# Patient Record
Sex: MALE | Race: OTHER | HISPANIC OR LATINO | URBAN - METROPOLITAN AREA
[De-identification: names, ages, dates, MRNs, and addresses within clinical notes are randomized per-mention and may not be internally consistent; named-entity substitution may affect disease eponyms.]

---

## 2017-04-02 ENCOUNTER — INPATIENT (INPATIENT)
Facility: HOSPITAL | Age: 52
LOS: 8 days | Discharge: HOME HEALTH | DRG: 494 | End: 2017-04-11
Attending: SPECIALIST | Admitting: SPECIALIST
Payer: COMMERCIAL

## 2017-04-02 ENCOUNTER — EMERGENCY (EMERGENCY)
Facility: HOSPITAL | Age: 52
LOS: 1 days | Discharge: OTHER INSTITUTION | End: 2017-04-02
Attending: EMERGENCY MEDICINE | Admitting: EMERGENCY MEDICINE
Payer: MEDICAID

## 2017-04-02 VITALS
OXYGEN SATURATION: 95 % | DIASTOLIC BLOOD PRESSURE: 79 MMHG | TEMPERATURE: 99 F | HEART RATE: 102 BPM | RESPIRATION RATE: 16 BRPM | SYSTOLIC BLOOD PRESSURE: 126 MMHG

## 2017-04-02 VITALS
OXYGEN SATURATION: 97 % | DIASTOLIC BLOOD PRESSURE: 80 MMHG | SYSTOLIC BLOOD PRESSURE: 141 MMHG | HEART RATE: 102 BPM | TEMPERATURE: 99 F | RESPIRATION RATE: 16 BRPM

## 2017-04-02 VITALS
OXYGEN SATURATION: 96 % | RESPIRATION RATE: 20 BRPM | HEART RATE: 105 BPM | SYSTOLIC BLOOD PRESSURE: 146 MMHG | DIASTOLIC BLOOD PRESSURE: 71 MMHG

## 2017-04-02 DIAGNOSIS — M25.561 PAIN IN RIGHT KNEE: ICD-10-CM

## 2017-04-02 DIAGNOSIS — F17.200 NICOTINE DEPENDENCE, UNSPECIFIED, UNCOMPLICATED: ICD-10-CM

## 2017-04-02 DIAGNOSIS — S82.201A UNSPECIFIED FRACTURE OF SHAFT OF RIGHT TIBIA, INITIAL ENCOUNTER FOR CLOSED FRACTURE: ICD-10-CM

## 2017-04-02 LAB
ALBUMIN SERPL ELPH-MCNC: 4.5 G/DL — SIGNIFICANT CHANGE UP (ref 3.4–5)
ALP SERPL-CCNC: 76 U/L — SIGNIFICANT CHANGE UP (ref 40–120)
ALT FLD-CCNC: 45 U/L — HIGH (ref 12–42)
ANION GAP SERPL CALC-SCNC: 15 MMOL/L — SIGNIFICANT CHANGE UP (ref 9–16)
APTT BLD: 27.4 SEC — LOW (ref 27.5–36.5)
AST SERPL-CCNC: 29 U/L — SIGNIFICANT CHANGE UP (ref 15–37)
BASOPHILS NFR BLD AUTO: 0.5 % — SIGNIFICANT CHANGE UP (ref 0–2)
BILIRUB SERPL-MCNC: 1.1 MG/DL — SIGNIFICANT CHANGE UP (ref 0.2–1.2)
BUN SERPL-MCNC: 17 MG/DL — SIGNIFICANT CHANGE UP (ref 7–23)
CALCIUM SERPL-MCNC: 9.3 MG/DL — SIGNIFICANT CHANGE UP (ref 8.5–10.5)
CHLORIDE SERPL-SCNC: 102 MMOL/L — SIGNIFICANT CHANGE UP (ref 96–108)
CO2 SERPL-SCNC: 23 MMOL/L — SIGNIFICANT CHANGE UP (ref 22–31)
CREAT SERPL-MCNC: 1.04 MG/DL — SIGNIFICANT CHANGE UP (ref 0.5–1.3)
EOSINOPHIL NFR BLD AUTO: 0 % — SIGNIFICANT CHANGE UP (ref 0–6)
GLUCOSE SERPL-MCNC: 84 MG/DL — SIGNIFICANT CHANGE UP (ref 70–99)
HCT VFR BLD CALC: 42.4 % — SIGNIFICANT CHANGE UP (ref 39–50)
HGB BLD-MCNC: 14.5 G/DL — SIGNIFICANT CHANGE UP (ref 13–17)
IMM GRANULOCYTES NFR BLD AUTO: 0.7 % — SIGNIFICANT CHANGE UP (ref 0–1.5)
INR BLD: 1.05 — SIGNIFICANT CHANGE UP (ref 0.88–1.16)
LYMPHOCYTES # BLD AUTO: 13.9 % — SIGNIFICANT CHANGE UP (ref 13–44)
MCHC RBC-ENTMCNC: 31.9 PG — SIGNIFICANT CHANGE UP (ref 27–34)
MCHC RBC-ENTMCNC: 34.2 G/DL — SIGNIFICANT CHANGE UP (ref 32–36)
MCV RBC AUTO: 93.2 FL — SIGNIFICANT CHANGE UP (ref 80–100)
MONOCYTES NFR BLD AUTO: 8 % — SIGNIFICANT CHANGE UP (ref 2–14)
NEUTROPHILS NFR BLD AUTO: 76.9 % — SIGNIFICANT CHANGE UP (ref 43–77)
PLATELET # BLD AUTO: 190 K/UL — SIGNIFICANT CHANGE UP (ref 150–400)
POTASSIUM SERPL-MCNC: 4.3 MMOL/L — SIGNIFICANT CHANGE UP (ref 3.5–5.3)
POTASSIUM SERPL-SCNC: 4.3 MMOL/L — SIGNIFICANT CHANGE UP (ref 3.5–5.3)
PROT SERPL-MCNC: 8 G/DL — SIGNIFICANT CHANGE UP (ref 6.4–8.2)
PROTHROM AB SERPL-ACNC: 11.6 SEC — SIGNIFICANT CHANGE UP (ref 9.8–12.7)
RBC # BLD: 4.55 M/UL — SIGNIFICANT CHANGE UP (ref 4.2–5.8)
RBC # FLD: 13.1 % — SIGNIFICANT CHANGE UP (ref 10.3–16.9)
SODIUM SERPL-SCNC: 140 MMOL/L — SIGNIFICANT CHANGE UP (ref 132–145)
WBC # BLD: 12.2 K/UL — HIGH (ref 3.8–10.5)
WBC # FLD AUTO: 12.2 K/UL — HIGH (ref 3.8–10.5)

## 2017-04-02 PROCEDURE — 70450 CT HEAD/BRAIN W/O DYE: CPT | Mod: 26

## 2017-04-02 PROCEDURE — 73590 X-RAY EXAM OF LOWER LEG: CPT | Mod: 26,RT

## 2017-04-02 PROCEDURE — 73700 CT LOWER EXTREMITY W/O DYE: CPT | Mod: 26,RT

## 2017-04-02 PROCEDURE — 99285 EMERGENCY DEPT VISIT HI MDM: CPT | Mod: 25

## 2017-04-02 PROCEDURE — 73562 X-RAY EXAM OF KNEE 3: CPT | Mod: 26,RT

## 2017-04-02 PROCEDURE — 99285 EMERGENCY DEPT VISIT HI MDM: CPT

## 2017-04-02 PROCEDURE — 71010: CPT | Mod: 26

## 2017-04-02 RX ORDER — MORPHINE SULFATE 50 MG/1
4 CAPSULE, EXTENDED RELEASE ORAL EVERY 4 HOURS
Qty: 0 | Refills: 0 | Status: DISCONTINUED | OUTPATIENT
Start: 2017-04-02 | End: 2017-04-03

## 2017-04-02 RX ORDER — MORPHINE SULFATE 50 MG/1
2 CAPSULE, EXTENDED RELEASE ORAL EVERY 4 HOURS
Qty: 0 | Refills: 0 | Status: DISCONTINUED | OUTPATIENT
Start: 2017-04-02 | End: 2017-04-04

## 2017-04-02 RX ORDER — OXYCODONE HYDROCHLORIDE 5 MG/1
10 TABLET ORAL EVERY 4 HOURS
Qty: 0 | Refills: 0 | Status: DISCONTINUED | OUTPATIENT
Start: 2017-04-02 | End: 2017-04-03

## 2017-04-02 RX ORDER — SODIUM CHLORIDE 9 MG/ML
1000 INJECTION, SOLUTION INTRAVENOUS
Qty: 0 | Refills: 0 | Status: DISCONTINUED | OUTPATIENT
Start: 2017-04-02 | End: 2017-04-03

## 2017-04-02 RX ORDER — MORPHINE SULFATE 50 MG/1
2 CAPSULE, EXTENDED RELEASE ORAL ONCE
Qty: 0 | Refills: 0 | Status: DISCONTINUED | OUTPATIENT
Start: 2017-04-02 | End: 2017-04-02

## 2017-04-02 RX ORDER — SODIUM CHLORIDE 9 MG/ML
3 INJECTION INTRAMUSCULAR; INTRAVENOUS; SUBCUTANEOUS ONCE
Qty: 0 | Refills: 0 | Status: COMPLETED | OUTPATIENT
Start: 2017-04-02 | End: 2017-04-02

## 2017-04-02 RX ORDER — OXYCODONE HYDROCHLORIDE 5 MG/1
5 TABLET ORAL EVERY 4 HOURS
Qty: 0 | Refills: 0 | Status: DISCONTINUED | OUTPATIENT
Start: 2017-04-02 | End: 2017-04-03

## 2017-04-02 RX ORDER — KETOROLAC TROMETHAMINE 30 MG/ML
30 SYRINGE (ML) INJECTION ONCE
Qty: 0 | Refills: 0 | Status: DISCONTINUED | OUTPATIENT
Start: 2017-04-02 | End: 2017-04-02

## 2017-04-02 RX ORDER — HYDROMORPHONE HYDROCHLORIDE 2 MG/ML
1 INJECTION INTRAMUSCULAR; INTRAVENOUS; SUBCUTANEOUS ONCE
Qty: 0 | Refills: 0 | Status: DISCONTINUED | OUTPATIENT
Start: 2017-04-02 | End: 2017-04-02

## 2017-04-02 RX ORDER — INFLUENZA VIRUS VACCINE 15; 15; 15; 15 UG/.5ML; UG/.5ML; UG/.5ML; UG/.5ML
0.5 SUSPENSION INTRAMUSCULAR ONCE
Qty: 0 | Refills: 0 | Status: COMPLETED | OUTPATIENT
Start: 2017-04-02 | End: 2017-04-02

## 2017-04-02 RX ORDER — CAPTOPRIL 12.5 MG/1
50 TABLET ORAL ONCE
Qty: 0 | Refills: 0 | Status: DISCONTINUED | OUTPATIENT
Start: 2017-04-02 | End: 2017-04-02

## 2017-04-02 RX ADMIN — MORPHINE SULFATE 2 MILLIGRAM(S): 50 CAPSULE, EXTENDED RELEASE ORAL at 12:10

## 2017-04-02 RX ADMIN — SODIUM CHLORIDE 120 MILLILITER(S): 9 INJECTION, SOLUTION INTRAVENOUS at 19:24

## 2017-04-02 RX ADMIN — Medication 30 MILLIGRAM(S): at 11:16

## 2017-04-02 RX ADMIN — MORPHINE SULFATE 2 MILLIGRAM(S): 50 CAPSULE, EXTENDED RELEASE ORAL at 11:16

## 2017-04-02 RX ADMIN — MORPHINE SULFATE 4 MILLIGRAM(S): 50 CAPSULE, EXTENDED RELEASE ORAL at 23:30

## 2017-04-02 RX ADMIN — MORPHINE SULFATE 4 MILLIGRAM(S): 50 CAPSULE, EXTENDED RELEASE ORAL at 19:24

## 2017-04-02 RX ADMIN — OXYCODONE HYDROCHLORIDE 10 MILLIGRAM(S): 5 TABLET ORAL at 21:12

## 2017-04-02 RX ADMIN — OXYCODONE HYDROCHLORIDE 10 MILLIGRAM(S): 5 TABLET ORAL at 19:59

## 2017-04-02 RX ADMIN — MORPHINE SULFATE 4 MILLIGRAM(S): 50 CAPSULE, EXTENDED RELEASE ORAL at 19:39

## 2017-04-02 RX ADMIN — Medication 30 MILLIGRAM(S): at 12:10

## 2017-04-02 RX ADMIN — SODIUM CHLORIDE 3 MILLILITER(S): 9 INJECTION INTRAMUSCULAR; INTRAVENOUS; SUBCUTANEOUS at 11:16

## 2017-04-02 RX ADMIN — MORPHINE SULFATE 4 MILLIGRAM(S): 50 CAPSULE, EXTENDED RELEASE ORAL at 23:45

## 2017-04-02 RX ADMIN — HYDROMORPHONE HYDROCHLORIDE 1 MILLIGRAM(S): 2 INJECTION INTRAMUSCULAR; INTRAVENOUS; SUBCUTANEOUS at 17:03
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
## 2017-04-02 NOTE — ED ADULT NURSE REASSESSMENT NOTE - NS ED NURSE REASSESS COMMENT FT1
Pt is laying in bed comfortably, in NAD. Pt was seen by ortho, pending transfer to [facility] ED. Will continue to monitor.

## 2017-04-02 NOTE — ED PROVIDER NOTE - MEDICAL DECISION MAKING DETAILS
uncomplicated ed course. pt to be transferred to [facility] ED - accepted by ortho dr Preciado to his service.  endorsed to ER [facility] Dr. Brown. uncomplicated ed course. pt to be transferred to [facility] ED - accepted by ortho dr Preciado to his service.  endorsed to ER [facility] Dr. Brown.    RLE 2+ dp pulse warm skin uncomplicated ed course. pt to be transferred to [facility] ED - accepted by ortho dr Preciado to his service.  endorsed to ER [facility] Dr. Saez.    RLE 2+ dp pulse warm skin

## 2017-04-02 NOTE — ED PROVIDER NOTE - NEUROLOGICAL, MLM
Alert and oriented, no focal deficits. speech fluent and appropriate Alert and oriented, no focal deficits. speech fluent and appropriate speech fluent and appropriate cooperative
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
## 2017-04-02 NOTE — PROGRESS NOTE ADULT - SUBJECTIVE AND OBJECTIVE BOX
Ortho Preop Note    Patient is a 51y old  Male who presents with a chief complaint of right tibial plateau fracture (02 Apr 2017 18:41)    Diagnosis: right tibial plateau fracture  Procedure: ORIF vs ex fix  Surgeon: Nick Almeida   12.2  )-----------( 190      ( 02 Apr 2017 11:21 )             42.4     02 Apr 2017 11:21    140    |  102    |  17     ----------------------------<  84     4.3     |  23     |  1.04     Ca    9.3        02 Apr 2017 11:21    TPro  8.0    /  Alb  4.5    /  TBili  1.1    /  DBili  x      /  AST  29     /  ALT  45     /  AlkPhos  76     02 Apr 2017 11:21    PT/INR - ( 02 Apr 2017 11:21 )   PT: 11.6 sec;   INR: 1.05          PTT - ( 02 Apr 2017 11:21 )  PTT:27.4 sec      [ ] Type & Screen  [x] CBC  [x] BMP  [x] PT/PTT/INR  [ ] Urinalysis  [x] Chest X-ray  [x] EKG  [x] NPO/IVF  [x] Consent  [ ] Clearance  [x] Added on to OR Schedule  [x] Anti-coagulation held  [N/A] MRSA/MSSA Nasal Screen

## 2017-04-02 NOTE — H&P ADULT - ASSESSMENT
-admit to Orthopaedic service  -operative treatment ORIF vs external fixation  -preoperative planning: NPO, IVF  -pain control  -obtain medical clearance  -hold DVT prophylaxis  -restart home medications  -monitor vital signs  -nonweightbearing status until post procedure  -physical therapy evaluation  -discussed with senior resident and attending on call
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
## 2017-04-02 NOTE — PRE-OP CHECKLIST - SELECT TESTS ORDERED
CMP/CBC Type and Screen/CMP/Urinalysis/BMP/EKG/PT/PTT/CBC/INR INR/EKG/BMP/CMP/Type and Screen/CXR/CBC/PT/PTT/Urinalysis
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
## 2017-04-02 NOTE — ED PROVIDER NOTE - CONDUCTED A DETAILED DISCUSSION WITH PATIENT AND/OR GUARDIAN REGARDING, MDM
need for transfer/lab results/need to admit/radiology results/return to ED if symptoms worsen, persist or questions arise
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
## 2017-04-02 NOTE — H&P ADULT - NSHPPHYSICALEXAM_GEN_ALL_CORE
O: AOx3, in bed, reclined, comfortable  Motor: intact GS/TA/EHL/FHL BL  Right knee swollen, ttp, no echymosses or skin break  SILT to patients baseline BL  WWP DP PT BL  Removed long leg posterior splint and applied knee immobilizer

## 2017-04-02 NOTE — ED PROVIDER NOTE - MUSCULOSKELETAL, MLM
Spine appears normal, range of motion is not limited, no muscle or joint tenderness + tenderness swelling to right knee w decreased rom secondary to pain

## 2017-04-03 DIAGNOSIS — Z01.818 ENCOUNTER FOR OTHER PREPROCEDURAL EXAMINATION: ICD-10-CM

## 2017-04-03 DIAGNOSIS — K21.9 GASTRO-ESOPHAGEAL REFLUX DISEASE WITHOUT ESOPHAGITIS: ICD-10-CM

## 2017-04-03 DIAGNOSIS — S82.141A DISPLACED BICONDYLAR FRACTURE OF RIGHT TIBIA, INITIAL ENCOUNTER FOR CLOSED FRACTURE: ICD-10-CM

## 2017-04-03 LAB
APPEARANCE UR: CLEAR — SIGNIFICANT CHANGE UP
BILIRUB UR-MCNC: (no result)
COLOR SPEC: YELLOW — SIGNIFICANT CHANGE UP
DIFF PNL FLD: (no result)
GLUCOSE UR QL: NEGATIVE — SIGNIFICANT CHANGE UP
KETONES UR-MCNC: >=80 MG/DL
LEUKOCYTE ESTERASE UR-ACNC: NEGATIVE — SIGNIFICANT CHANGE UP
NITRITE UR-MCNC: NEGATIVE — SIGNIFICANT CHANGE UP
PH UR: 5.5 — SIGNIFICANT CHANGE UP (ref 4–8)
PROT UR-MCNC: (no result) MG/DL
SP GR SPEC: 1.02 — SIGNIFICANT CHANGE UP (ref 1–1.03)
UROBILINOGEN FLD QL: 0.2 E.U./DL — SIGNIFICANT CHANGE UP

## 2017-04-03 PROCEDURE — 99222 1ST HOSP IP/OBS MODERATE 55: CPT | Mod: GC

## 2017-04-03 PROCEDURE — 73560 X-RAY EXAM OF KNEE 1 OR 2: CPT | Mod: 26,RT

## 2017-04-03 RX ORDER — OXYCODONE HYDROCHLORIDE 5 MG/1
10 TABLET ORAL EVERY 4 HOURS
Qty: 0 | Refills: 0 | Status: DISCONTINUED | OUTPATIENT
Start: 2017-04-03 | End: 2017-04-04

## 2017-04-03 RX ORDER — DOCUSATE SODIUM 100 MG
100 CAPSULE ORAL THREE TIMES A DAY
Qty: 0 | Refills: 0 | Status: DISCONTINUED | OUTPATIENT
Start: 2017-04-03 | End: 2017-04-11

## 2017-04-03 RX ORDER — MORPHINE SULFATE 50 MG/1
4 CAPSULE, EXTENDED RELEASE ORAL EVERY 4 HOURS
Qty: 0 | Refills: 0 | Status: DISCONTINUED | OUTPATIENT
Start: 2017-04-03 | End: 2017-04-04

## 2017-04-03 RX ORDER — SIMETHICONE 80 MG/1
80 TABLET, CHEWABLE ORAL
Qty: 0 | Refills: 0 | Status: DISCONTINUED | OUTPATIENT
Start: 2017-04-03 | End: 2017-04-11

## 2017-04-03 RX ORDER — SODIUM CHLORIDE 9 MG/ML
1000 INJECTION, SOLUTION INTRAVENOUS
Qty: 0 | Refills: 0 | Status: DISCONTINUED | OUTPATIENT
Start: 2017-04-03 | End: 2017-04-04

## 2017-04-03 RX ORDER — SODIUM CHLORIDE 9 MG/ML
1000 INJECTION, SOLUTION INTRAVENOUS
Qty: 0 | Refills: 0 | Status: DISCONTINUED | OUTPATIENT
Start: 2017-04-03 | End: 2017-04-03

## 2017-04-03 RX ORDER — OXYCODONE HYDROCHLORIDE 5 MG/1
5 TABLET ORAL EVERY 4 HOURS
Qty: 0 | Refills: 0 | Status: DISCONTINUED | OUTPATIENT
Start: 2017-04-03 | End: 2017-04-04

## 2017-04-03 RX ORDER — HEPARIN SODIUM 5000 [USP'U]/ML
5000 INJECTION INTRAVENOUS; SUBCUTANEOUS EVERY 8 HOURS
Qty: 0 | Refills: 0 | Status: DISCONTINUED | OUTPATIENT
Start: 2017-04-03 | End: 2017-04-11

## 2017-04-03 RX ORDER — ONDANSETRON 8 MG/1
4 TABLET, FILM COATED ORAL EVERY 4 HOURS
Qty: 0 | Refills: 0 | Status: DISCONTINUED | OUTPATIENT
Start: 2017-04-03 | End: 2017-04-11

## 2017-04-03 RX ORDER — MORPHINE SULFATE 50 MG/1
4 CAPSULE, EXTENDED RELEASE ORAL
Qty: 0 | Refills: 0 | Status: DISCONTINUED | OUTPATIENT
Start: 2017-04-03 | End: 2017-04-04

## 2017-04-03 RX ORDER — CEFAZOLIN SODIUM 1 G
2000 VIAL (EA) INJECTION EVERY 8 HOURS
Qty: 0 | Refills: 0 | Status: COMPLETED | OUTPATIENT
Start: 2017-04-03 | End: 2017-04-04

## 2017-04-03 RX ADMIN — MORPHINE SULFATE 2 MILLIGRAM(S): 50 CAPSULE, EXTENDED RELEASE ORAL at 23:55

## 2017-04-03 RX ADMIN — SIMETHICONE 80 MILLIGRAM(S): 80 TABLET, CHEWABLE ORAL at 21:15

## 2017-04-03 RX ADMIN — OXYCODONE HYDROCHLORIDE 10 MILLIGRAM(S): 5 TABLET ORAL at 02:00

## 2017-04-03 RX ADMIN — Medication 100 MILLIGRAM(S): at 23:00

## 2017-04-03 RX ADMIN — OXYCODONE HYDROCHLORIDE 10 MILLIGRAM(S): 5 TABLET ORAL at 21:03

## 2017-04-03 RX ADMIN — HEPARIN SODIUM 5000 UNIT(S): 5000 INJECTION INTRAVENOUS; SUBCUTANEOUS at 21:03

## 2017-04-03 RX ADMIN — MORPHINE SULFATE 2 MILLIGRAM(S): 50 CAPSULE, EXTENDED RELEASE ORAL at 23:41

## 2017-04-03 RX ADMIN — OXYCODONE HYDROCHLORIDE 10 MILLIGRAM(S): 5 TABLET ORAL at 10:51

## 2017-04-03 RX ADMIN — OXYCODONE HYDROCHLORIDE 10 MILLIGRAM(S): 5 TABLET ORAL at 02:41

## 2017-04-03 RX ADMIN — SODIUM CHLORIDE 140 MILLILITER(S): 9 INJECTION, SOLUTION INTRAVENOUS at 17:58

## 2017-04-03 RX ADMIN — OXYCODONE HYDROCHLORIDE 10 MILLIGRAM(S): 5 TABLET ORAL at 11:51

## 2017-04-03 RX ADMIN — Medication 100 MILLIGRAM(S): at 21:03

## 2017-04-03 RX ADMIN — OXYCODONE HYDROCHLORIDE 10 MILLIGRAM(S): 5 TABLET ORAL at 22:00

## 2017-04-03 RX ADMIN — SODIUM CHLORIDE 80 MILLILITER(S): 9 INJECTION, SOLUTION INTRAVENOUS at 10:59

## 2017-04-03 NOTE — PROVIDER CONTACT NOTE (OTHER) - SITUATION
Pt. unable to void since report from ER that he voided prior to coming to floor. Pt. states he has bladder discomfort 8/10 with distention. MD Milner rounded on Pt prior to this and mentioned a salcido.

## 2017-04-03 NOTE — PROVIDER CONTACT NOTE (OTHER) - RECOMMENDATIONS
MD Milner notified of findings. Pt is refusing catheter at this time. Insists to wait longer to try to void on his own.

## 2017-04-03 NOTE — PROGRESS NOTE ADULT - SUBJECTIVE AND OBJECTIVE BOX
ORTHO NOTE    [x ] Pt seen/examined.  [ x] Pt without any complaints/in NAD.    [ ] Pt complains of:      ROS: [ ] Fever  [ ] Chills  [ ] CP [ ] SOB [ ] Dysnea  [ ] Palpitations [ ] Cough [ ] N/V/C/D [ ] Paresthia [ ] Other     [ x] ROS  otherwise negative    .    PHYSICAL EXAM:    Vital Signs Last 24 Hrs  T(C): 36.4, Max: 37.4 (04-02 @ 17:41)  T(F): 97.5, Max: 99.3 (04-02 @ 17:41)  HR: 90 (90 - 103)  BP: 122/69 (107/78 - 126/79)  BP(mean): --  RR: 17 (16 - 18)  SpO2: 98% (95% - 98%)    I&O's Detail  I & Os for 24h ending 03 Apr 2017 07:00  =============================================  IN:    lactated ringers.: 840 ml    Total IN: 840 ml  ---------------------------------------------  OUT:    Total OUT: 0 ml  ---------------------------------------------  Total NET: 840 ml    I & Os for current day (as of 03 Apr 2017 13:16)  =============================================  IN:    Total IN: 0 ml  ---------------------------------------------  OUT:    Voided: 550 ml    Total OUT: 550 ml  ---------------------------------------------  Total NET: -550 ml       CAPILLARY BLOOD GLUCOSE                  Neuro: AAOX3    Lungs: CTA, IS demonstrated    CV:     ABD: soft, nontender     Ext: right LE NVID; right LE locked in knee immobilizer     LABS                        14.5   12.2  )-----------( 190      ( 02 Apr 2017 11:21 )             42.4                              PT/INR - ( 02 Apr 2017 11:21 )   PT: 11.6 sec;   INR: 1.05          PTT - ( 02 Apr 2017 11:21 )  PTT:27.4 sec  02 Apr 2017 11:21    140    |  102    |  17     ----------------------------<  84     4.3     |  23     |  1.04     Ca    9.3        02 Apr 2017 11:21    TPro  8.0    /  Alb  4.5    /  TBili  1.1    /  DBili  x      /  AST  29     /  ALT  45     /  AlkPhos  76     02 Apr 2017 11:21      [ ] Other Labs  [ ] None ordered            Please check or [place] when present:  •  Heart Failure:    [ ] Acute        [ ]  Acute on Chronic        [ ] Chronic         [ ] Diastolic     [ ]  Combined    •  RHEA:     [ ] ATN        [ ]  Renal medullary necrosis       [ ]  Renal cortical necrosis                  [ ] Other pathological Lesion:  •  CKD:  [ ] Stage I   [ ] Stage II  [ ] Stage III    [ ]Stage IV   [ ]  CKD V   [ ]  Other/Unspecified:    •  Abdominal Nutritional Status:   [ ] Malnutrition-See Nutrition note    [ ] Cachexia   [ ]  Other        [ ] Supplement ordered:            [ ] Morbid Obesity: BMI >=40         ASSESSMENT/PLAN:  Pre-op right tibial plateau ORIF  Medically cleared by Dr. Cervantes  LR @ 80  CONTINUE:          [ ] DVT PPX- scd boot L LE    [ ] Pain Mgt- IV meds

## 2017-04-03 NOTE — CONSULT NOTE ADULT - PROBLEM SELECTOR RECOMMENDATION 3
The patient's medical condition is optimized for surgery.  There is no contraindication for surgery.  There is no clinical evidence neither of angina, decompensated CHF, arrhthymias, nor valvular disease.   There is no limitation of exercise capacity.  MET is  VI.  ASA class is I .  Wagner cardiac risk factor is low .  DVT prophylaxis is indicated.  Pain control.  Early mobilization.  Avoid fluid overload.

## 2017-04-03 NOTE — PROGRESS NOTE ADULT - SUBJECTIVE AND OBJECTIVE BOX
Orthopaedics Post Op Check    Procedure: R Tibial Plateau ORIF  Surgeon: Dr. Romero    Pt comfortable, without complaints  Denies CP, SOB, N/V    Vital Signs Last 24 Hrs  T(C): 37.4, Max: 37.6 (04-03 @ 13:58)  T(F): 99.3, Max: 99.7 (04-03 @ 13:58)  HR: 96 (88 - 108)  BP: 162/84 (122/69 - 162/84)  BP(mean): --  RR: 17 (15 - 17)  SpO2: 96% (95% - 99%)  AVSS, NAD    Dressing C/D/I  General: Pt Alert and oriented   Pulses: PALPABLE DP and PT pulses w/ triphasic waves on doppler check  Sensation: SILT  Motor: Intact EHL/FHL  There is swelling at extremity  PROM does not cause pain at compartments  Lower (Right leg) compartments soft and depressible.                           14.5   12.2  )-----------( 190      ( 02 Apr 2017 11:21 )             42.4   02 Apr 2017 11:21    140    |  102    |  17     ----------------------------<  84     4.3     |  23     |  1.04     Ca    9.3        02 Apr 2017 11:21    TPro  8.0    /  Alb  4.5    /  TBili  1.1    /  DBili  x      /  AST  29     /  ALT  45     /  AlkPhos  76     02 Apr 2017 11:21    A/P: 51yMale POD#0 s/p R tibia plateau ORIF  - Stable  - Pain Control  - DVT ppx  - Post op abx  - PT, WBS: NWB in knee immobilizer  - F/U AM Labs

## 2017-04-03 NOTE — PROVIDER CONTACT NOTE (OTHER) - BACKGROUND
Pt. assisted to bed side and then again to BR to provide more comfort to void. Pt still unable to void. Assts. back to bed.
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
## 2017-04-03 NOTE — PROVIDER CONTACT NOTE (OTHER) - ACTION/TREATMENT ORDERED:
MD Milner stated to let him try longer on his own and to re assess. Day team to address this morning if pt. is still unable to void. MD Milner stated to let him try longer on his own and to re assess. UPdate: re assessed at 745 - pt. still refuses. Day team to address this morning if pt. is still unable to void.

## 2017-04-04 DIAGNOSIS — R00.0 TACHYCARDIA, UNSPECIFIED: ICD-10-CM

## 2017-04-04 LAB
ANION GAP SERPL CALC-SCNC: 11 MMOL/L — SIGNIFICANT CHANGE UP (ref 9–16)
BASOPHILS NFR BLD AUTO: 0.2 % — SIGNIFICANT CHANGE UP (ref 0–2)
BUN SERPL-MCNC: 12 MG/DL — SIGNIFICANT CHANGE UP (ref 7–23)
CALCIUM SERPL-MCNC: 8.2 MG/DL — LOW (ref 8.5–10.5)
CHLORIDE SERPL-SCNC: 101 MMOL/L — SIGNIFICANT CHANGE UP (ref 96–108)
CO2 SERPL-SCNC: 24 MMOL/L — SIGNIFICANT CHANGE UP (ref 22–31)
CREAT SERPL-MCNC: 0.93 MG/DL — SIGNIFICANT CHANGE UP (ref 0.5–1.3)
EOSINOPHIL NFR BLD AUTO: 0.1 % — SIGNIFICANT CHANGE UP (ref 0–6)
GLUCOSE SERPL-MCNC: 105 MG/DL — HIGH (ref 70–99)
HCT VFR BLD CALC: 34.7 % — LOW (ref 39–50)
HGB BLD-MCNC: 11.6 G/DL — LOW (ref 13–17)
LYMPHOCYTES # BLD AUTO: 12.6 % — LOW (ref 13–44)
MCHC RBC-ENTMCNC: 31.4 PG — SIGNIFICANT CHANGE UP (ref 27–34)
MCHC RBC-ENTMCNC: 33.4 G/DL — SIGNIFICANT CHANGE UP (ref 32–36)
MCV RBC AUTO: 94 FL — SIGNIFICANT CHANGE UP (ref 80–100)
MONOCYTES NFR BLD AUTO: 15.7 % — HIGH (ref 2–14)
NEUTROPHILS NFR BLD AUTO: 71.4 % — SIGNIFICANT CHANGE UP (ref 43–77)
PLATELET # BLD AUTO: 137 K/UL — LOW (ref 150–400)
POTASSIUM SERPL-MCNC: 3.9 MMOL/L — SIGNIFICANT CHANGE UP (ref 3.5–5.3)
POTASSIUM SERPL-SCNC: 3.9 MMOL/L — SIGNIFICANT CHANGE UP (ref 3.5–5.3)
RBC # BLD: 3.69 M/UL — LOW (ref 4.2–5.8)
RBC # FLD: 12.9 % — SIGNIFICANT CHANGE UP (ref 10.3–16.9)
SODIUM SERPL-SCNC: 136 MMOL/L — SIGNIFICANT CHANGE UP (ref 135–145)
WBC # BLD: 8 K/UL — SIGNIFICANT CHANGE UP (ref 3.8–10.5)
WBC # FLD AUTO: 8 K/UL — SIGNIFICANT CHANGE UP (ref 3.8–10.5)

## 2017-04-04 PROCEDURE — 93010 ELECTROCARDIOGRAM REPORT: CPT

## 2017-04-04 PROCEDURE — 99232 SBSQ HOSP IP/OBS MODERATE 35: CPT | Mod: GC

## 2017-04-04 RX ORDER — ACETAMINOPHEN 500 MG
650 TABLET ORAL EVERY 6 HOURS
Qty: 0 | Refills: 0 | Status: DISCONTINUED | OUTPATIENT
Start: 2017-04-04 | End: 2017-04-11

## 2017-04-04 RX ORDER — HYDROMORPHONE HYDROCHLORIDE 2 MG/ML
4 INJECTION INTRAMUSCULAR; INTRAVENOUS; SUBCUTANEOUS EVERY 4 HOURS
Qty: 0 | Refills: 0 | Status: DISCONTINUED | OUTPATIENT
Start: 2017-04-04 | End: 2017-04-11

## 2017-04-04 RX ORDER — SODIUM CHLORIDE 9 MG/ML
1000 INJECTION INTRAMUSCULAR; INTRAVENOUS; SUBCUTANEOUS
Qty: 0 | Refills: 0 | Status: DISCONTINUED | OUTPATIENT
Start: 2017-04-04 | End: 2017-04-11

## 2017-04-04 RX ORDER — HYDROMORPHONE HYDROCHLORIDE 2 MG/ML
2 INJECTION INTRAMUSCULAR; INTRAVENOUS; SUBCUTANEOUS EVERY 4 HOURS
Qty: 0 | Refills: 0 | Status: DISCONTINUED | OUTPATIENT
Start: 2017-04-04 | End: 2017-04-11

## 2017-04-04 RX ORDER — MORPHINE SULFATE 50 MG/1
4 CAPSULE, EXTENDED RELEASE ORAL
Qty: 0 | Refills: 0 | Status: DISCONTINUED | OUTPATIENT
Start: 2017-04-04 | End: 2017-04-04

## 2017-04-04 RX ORDER — HYDROMORPHONE HYDROCHLORIDE 2 MG/ML
0.5 INJECTION INTRAMUSCULAR; INTRAVENOUS; SUBCUTANEOUS ONCE
Qty: 0 | Refills: 0 | Status: DISCONTINUED | OUTPATIENT
Start: 2017-04-04 | End: 2017-04-04

## 2017-04-04 RX ORDER — SODIUM CHLORIDE 9 MG/ML
500 INJECTION INTRAMUSCULAR; INTRAVENOUS; SUBCUTANEOUS ONCE
Qty: 0 | Refills: 0 | Status: COMPLETED | OUTPATIENT
Start: 2017-04-04 | End: 2017-04-04

## 2017-04-04 RX ADMIN — Medication 100 MILLIGRAM(S): at 14:21

## 2017-04-04 RX ADMIN — OXYCODONE HYDROCHLORIDE 10 MILLIGRAM(S): 5 TABLET ORAL at 02:11

## 2017-04-04 RX ADMIN — HYDROMORPHONE HYDROCHLORIDE 0.5 MILLIGRAM(S): 2 INJECTION INTRAMUSCULAR; INTRAVENOUS; SUBCUTANEOUS at 10:25

## 2017-04-04 RX ADMIN — Medication 100 MILLIGRAM(S): at 06:07

## 2017-04-04 RX ADMIN — MORPHINE SULFATE 2 MILLIGRAM(S): 50 CAPSULE, EXTENDED RELEASE ORAL at 04:31

## 2017-04-04 RX ADMIN — HYDROMORPHONE HYDROCHLORIDE 4 MILLIGRAM(S): 2 INJECTION INTRAMUSCULAR; INTRAVENOUS; SUBCUTANEOUS at 21:45

## 2017-04-04 RX ADMIN — MORPHINE SULFATE 4 MILLIGRAM(S): 50 CAPSULE, EXTENDED RELEASE ORAL at 08:04

## 2017-04-04 RX ADMIN — HYDROMORPHONE HYDROCHLORIDE 4 MILLIGRAM(S): 2 INJECTION INTRAMUSCULAR; INTRAVENOUS; SUBCUTANEOUS at 22:35

## 2017-04-04 RX ADMIN — HEPARIN SODIUM 5000 UNIT(S): 5000 INJECTION INTRAVENOUS; SUBCUTANEOUS at 06:06

## 2017-04-04 RX ADMIN — OXYCODONE HYDROCHLORIDE 10 MILLIGRAM(S): 5 TABLET ORAL at 13:20

## 2017-04-04 RX ADMIN — OXYCODONE HYDROCHLORIDE 10 MILLIGRAM(S): 5 TABLET ORAL at 07:00

## 2017-04-04 RX ADMIN — OXYCODONE HYDROCHLORIDE 10 MILLIGRAM(S): 5 TABLET ORAL at 06:11

## 2017-04-04 RX ADMIN — HYDROMORPHONE HYDROCHLORIDE 0.5 MILLIGRAM(S): 2 INJECTION INTRAMUSCULAR; INTRAVENOUS; SUBCUTANEOUS at 10:40

## 2017-04-04 RX ADMIN — MORPHINE SULFATE 4 MILLIGRAM(S): 50 CAPSULE, EXTENDED RELEASE ORAL at 01:03

## 2017-04-04 RX ADMIN — HYDROMORPHONE HYDROCHLORIDE 4 MILLIGRAM(S): 2 INJECTION INTRAMUSCULAR; INTRAVENOUS; SUBCUTANEOUS at 15:37

## 2017-04-04 RX ADMIN — MORPHINE SULFATE 2 MILLIGRAM(S): 50 CAPSULE, EXTENDED RELEASE ORAL at 04:46

## 2017-04-04 RX ADMIN — HYDROMORPHONE HYDROCHLORIDE 4 MILLIGRAM(S): 2 INJECTION INTRAMUSCULAR; INTRAVENOUS; SUBCUTANEOUS at 14:37

## 2017-04-04 RX ADMIN — OXYCODONE HYDROCHLORIDE 10 MILLIGRAM(S): 5 TABLET ORAL at 02:41

## 2017-04-04 RX ADMIN — HEPARIN SODIUM 5000 UNIT(S): 5000 INJECTION INTRAVENOUS; SUBCUTANEOUS at 14:22

## 2017-04-04 RX ADMIN — MORPHINE SULFATE 4 MILLIGRAM(S): 50 CAPSULE, EXTENDED RELEASE ORAL at 01:33

## 2017-04-04 RX ADMIN — HEPARIN SODIUM 5000 UNIT(S): 5000 INJECTION INTRAVENOUS; SUBCUTANEOUS at 21:43

## 2017-04-04 RX ADMIN — MORPHINE SULFATE 4 MILLIGRAM(S): 50 CAPSULE, EXTENDED RELEASE ORAL at 08:19

## 2017-04-04 RX ADMIN — Medication 650 MILLIGRAM(S): at 01:57

## 2017-04-04 RX ADMIN — Medication 100 MILLIGRAM(S): at 21:42

## 2017-04-04 RX ADMIN — SODIUM CHLORIDE 100 MILLILITER(S): 9 INJECTION INTRAMUSCULAR; INTRAVENOUS; SUBCUTANEOUS at 14:40

## 2017-04-04 RX ADMIN — OXYCODONE HYDROCHLORIDE 10 MILLIGRAM(S): 5 TABLET ORAL at 12:20

## 2017-04-04 RX ADMIN — Medication 100 MILLIGRAM(S): at 06:58

## 2017-04-04 NOTE — PROGRESS NOTE ADULT - SUBJECTIVE AND OBJECTIVE BOX
SUBJECTIVE: Patient seen and examined. Pain controlled.  Pt did well o/n  No cp/sob.     OBJECTIVE:  NAD  Vital Signs Last 24 Hrs  T(C): 37.1, Max: 38.3 (04-04 @ 01:51)  T(F): 98.7, Max: 101 (04-04 @ 01:51)  HR: 124 (95 - 124)  BP: 122/80 (119/67 - 154/89)  BP(mean): --  RR: 18 (14 - 18)  SpO2: 96% (94% - 98%)    Affected extremity:          Dressing: clean/dry/intact in knee immobilizer         Sensation: SILT         Motor exam: 4/5 TA/GS/EHL due to pain, compartment soft and compressible         warm well perfused; capillary refill <3 seconds                         11.6   8.0   )-----------( 137      ( 04 Apr 2017 07:15 )             34.7     04 Apr 2017 07:15    136    |  101    |  12     ----------------------------<  105    3.9     |  24     |  0.93     Ca    8.2        04 Apr 2017 07:15      A/P :  Pt is a 52yo Male s/p  R tib plateau ORIF  -    Pain control  -    DVT ppx: SCD/HSQ  -    Weight bearing status: NWB in splint  -    Physical Therapy  -    Dispo: TBD
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
## 2017-04-04 NOTE — PHYSICAL THERAPY INITIAL EVALUATION ADULT - PLANNED THERAPY INTERVENTIONS, PT EVAL
gait training/balance training/transfer training/bed mobility training/ROM/strengthening/postural re-education

## 2017-04-04 NOTE — PHYSICAL THERAPY INITIAL EVALUATION ADULT - RANGE OF MOTION EXAMINATION, REHAB EVAL
bilateral upper extremity ROM was WNL (within normal limits)/(R) LE locked in extension in knee immobilizer/Left LE ROM was WNL (within normal limits)/deficits as listed below

## 2017-04-04 NOTE — PHYSICAL THERAPY INITIAL EVALUATION ADULT - DISCHARGE DISPOSITION, PT EVAL
Discharge plan pending further evaluation (ambulation limited today due to resting HR > 120 (RN aware)

## 2017-04-04 NOTE — PROGRESS NOTE ADULT - SUBJECTIVE AND OBJECTIVE BOX
POST OPERATIVE DAY #:  [!]   STATUS POST: [ ] Left [X] Right         S/P ORIF right  bicondylar tibial plateau fracture               SUBJECTIVE: Patient seen and examined. [X ]     Pt states doing well and pain under control      OBJECTIVE:     Vital Signs Last 24 Hrs  T(C): 36.9, Max: 38.3 ( @ 01:51)  T(F): 98.4, Max: 101 ( @ 01:51)  HR: 123 (88 - 123)  BP: 119/73 (119/67 - 162/84)  BP(mean): --  RR: 17 (14 - 17)  SpO2: 94% (94% - 99%)    Affected extremity:          Dressing: [ ] clean/dry/intact  [ ] Other:           Sensation: [ ] intact to light touch  [ ] decreased:          Motor exam: [  ] / 5 Tibialis Anterior/Gastrocnemius-Soleus          [ ] warm well perfused; capillary refill <3 seconds     LABS:                        11.6   8.0   )-----------( 137      ( 2017 07:15 )             34.7     2017 07:15    136    |  101    |  12     ----------------------------<  105    3.9     |  24     |  0.93     Ca    8.2        2017 07:15        Urinalysis Basic - ( 2017 08:56 )    Color: Yellow / Appearance: Clear / S.025 / pH: x  Gluc: x / Ketone: >=80 mg/dL  / Bili: Small / Urobili: 0.2 E.U./dL   Blood: x / Protein: Trace mg/dL / Nitrite: NEGATIVE   Leuk Esterase: NEGATIVE / RBC: < 5 /HPF / WBC < 5 /HPF   Sq Epi: x / Non Sq Epi: Rare /HPF / Bacteria: Present /HPF        MEDICATIONS:influenza   Vaccine 0.5milliLiter(s) IntraMuscular once  heparin  Injectable 5000Unit(s) SubCutaneous every 8 hours  ondansetron Injectable 4milliGRAM(s) IV Push every 4 hours PRN  docusate sodium 100milliGRAM(s) Oral three times a day  simethicone 80milliGRAM(s) Chew two times a day PRN  acetaminophen   Tablet 650milliGRAM(s) Oral every 6 hours PRN  sodium chloride 0.9% Bolus 500milliLiter(s) IV Bolus once  HYDROmorphone   Tablet 2milliGRAM(s) Oral every 4 hours PRN  HYDROmorphone   Tablet 4milliGRAM(s) Oral every 4 hours PRN  sodium chloride 0.9%. 1000milliLiter(s) IV Continuous <Continuous>    Anticoagulation:  heparin  Injectable 5000Unit(s) SubCutaneous every 8 hours      Antibiotics:       Pain medications:   ondansetron Injectable 4milliGRAM(s) IV Push every 4 hours PRN  acetaminophen   Tablet 650milliGRAM(s) Oral every 6 hours PRN  HYDROmorphone   Tablet 2milliGRAM(s) Oral every 4 hours PRN  HYDROmorphone   Tablet 4milliGRAM(s) Oral every 4 hours PRN      RADIOLOGY & ADDITIONAL STUDIES:    Review of Radiographs of right knee 4.3.17  Xray: s/p orif right bicondylar tibial plateau fracture  anatomic reduction  joint good position  hardware good position/intact      A/P :    influenza   Vaccine 0.5milliLiter(s) IntraMuscular once  heparin  Injectable 5000Unit(s) SubCutaneous every 8 hours  ondansetron Injectable 4milliGRAM(s) IV Push every 4 hours PRN  docusate sodium 100milliGRAM(s) Oral three times a day  simethicone 80milliGRAM(s) Chew two times a day PRN  acetaminophen   Tablet 650milliGRAM(s) Oral every 6 hours PRN  sodium chloride 0.9% Bolus 500milliLiter(s) IV Bolus once  HYDROmorphone   Tablet 2milliGRAM(s) Oral every 4 hours PRN  HYDROmorphone   Tablet 4milliGRAM(s) Oral every 4 hours PRN  sodium chloride 0.9%. 1000milliLiter(s) IV Continuous <Continuous>   heparin  Injectable 5000Unit(s) SubCutaneous every 8 hours   s/p Right [ ] Left [ ]  ORIF  POD #   -    Pain control  -    DVT ppx: [id number] [ ] [id number] [ ] Lovenox [ ] Coumadin [ ] Heparin  [ ] Eliquis [ ] Xarelto [ ]   -    ADAT  -    Check AM labs  -    Weight bearing status: WBAT [ ]        PWB    [ ]     TTWB  [ ]      NWB  [X]  -    Resume home meds  -    Physical Therapy  Pitt brace locked at zero degrees    -    Dispo: Home [ x]     Rehab [ ]      KYLIE [ ]      To be determined [ ]  Pending D/C home NWB, [place] Brace, crutches

## 2017-04-04 NOTE — PHYSICAL THERAPY INITIAL EVALUATION ADULT - IMPAIRMENTS FOUND, PT EVAL
poor safety awareness/aerobic capacity/endurance/muscle strength/gait, locomotion, and balance/posture/ergonomics and body mechanics/ROM

## 2017-04-04 NOTE — PROGRESS NOTE ADULT - SUBJECTIVE AND OBJECTIVE BOX
Interval Events:  reviewed  Patient seen and examined at bedside.    Patient is a 51y old  Male who presents with a chief complaint of right tibial plateau fracture (2017 18:41)    He is doing well but have to sit up in bed to urinate  PAST MEDICAL & SURGICAL HISTORY:      MEDICATIONS:  Pulmonary:    Antimicrobials:    Anticoagulants:  heparin  Injectable 5000Unit(s) SubCutaneous every 8 hours    Cardiac:      Allergies    No Known Allergies    Intolerances        Vital Signs Last 24 Hrs  T(C): 37.1, Max: 38.3 ( @ 01:51)  T(F): 98.7, Max: 101 ( @ 01:51)  HR: 124 (95 - 124)  BP: 122/80 (119/67 - 154/89)  BP(mean): --  RR: 18 (14 - 18)  SpO2: 96% (94% - 98%)  I & Os for 24h ending  @ 07:00  =============================================  IN: 140 ml / OUT: 1750 ml / NET: -1610 ml    I & Os for current day (as of  @ 22:51)  =============================================  IN: 820 ml / OUT: 1900 ml / NET: -1080 ml        LABS:      CBC Full  -  ( 2017 07:15 )  WBC Count : 8.0 K/uL  Hemoglobin : 11.6 g/dL  Hematocrit : 34.7 %  Platelet Count - Automated : 137 K/uL  Mean Cell Volume : 94.0 fL  Mean Cell Hemoglobin : 31.4 pg  Mean Cell Hemoglobin Concentration : 33.4 g/dL  Auto Neutrophil # : x  Auto Lymphocyte # : x  Auto Monocyte # : x  Auto Eosinophil # : x  Auto Basophil # : x  Auto Neutrophil % : 71.4 %  Auto Lymphocyte % : 12.6 %  Auto Monocyte % : 15.7 %  Auto Eosinophil % : 0.1 %  Auto Basophil % : 0.2 %    2017 07:15    136    |  101    |  12     ----------------------------<  105    3.9     |  24     |  0.93     Ca    8.2        2017 07:15            Urinalysis Basic - ( 2017 08:56 )    Color: Yellow / Appearance: Clear / S.025 / pH: x  Gluc: x / Ketone: >=80 mg/dL  / Bili: Small / Urobili: 0.2 E.U./dL   Blood: x / Protein: Trace mg/dL / Nitrite: NEGATIVE   Leuk Esterase: NEGATIVE / RBC: < 5 /HPF / WBC < 5 /HPF   Sq Epi: x / Non Sq Epi: Rare /HPF / Bacteria: Present /HPF                  RADIOLOGY & ADDITIONAL STUDIES (The following images were personally reviewed):  Balderrama:                               No  Urine output:               Yes          DVT prophylaxis:         Yes          Flattus:                          Yes          Bowel movement:       Yes

## 2017-04-05 PROCEDURE — 99232 SBSQ HOSP IP/OBS MODERATE 35: CPT | Mod: GC

## 2017-04-05 RX ORDER — MAGNESIUM HYDROXIDE 400 MG/1
30 TABLET, CHEWABLE ORAL DAILY
Qty: 0 | Refills: 0 | Status: DISCONTINUED | OUTPATIENT
Start: 2017-04-05 | End: 2017-04-11

## 2017-04-05 RX ADMIN — HYDROMORPHONE HYDROCHLORIDE 4 MILLIGRAM(S): 2 INJECTION INTRAMUSCULAR; INTRAVENOUS; SUBCUTANEOUS at 12:38

## 2017-04-05 RX ADMIN — HYDROMORPHONE HYDROCHLORIDE 4 MILLIGRAM(S): 2 INJECTION INTRAMUSCULAR; INTRAVENOUS; SUBCUTANEOUS at 11:38

## 2017-04-05 RX ADMIN — HEPARIN SODIUM 5000 UNIT(S): 5000 INJECTION INTRAVENOUS; SUBCUTANEOUS at 07:06

## 2017-04-05 RX ADMIN — HYDROMORPHONE HYDROCHLORIDE 4 MILLIGRAM(S): 2 INJECTION INTRAMUSCULAR; INTRAVENOUS; SUBCUTANEOUS at 21:35

## 2017-04-05 RX ADMIN — HYDROMORPHONE HYDROCHLORIDE 4 MILLIGRAM(S): 2 INJECTION INTRAMUSCULAR; INTRAVENOUS; SUBCUTANEOUS at 02:20

## 2017-04-05 RX ADMIN — HYDROMORPHONE HYDROCHLORIDE 4 MILLIGRAM(S): 2 INJECTION INTRAMUSCULAR; INTRAVENOUS; SUBCUTANEOUS at 16:17

## 2017-04-05 RX ADMIN — HYDROMORPHONE HYDROCHLORIDE 4 MILLIGRAM(S): 2 INJECTION INTRAMUSCULAR; INTRAVENOUS; SUBCUTANEOUS at 17:17

## 2017-04-05 RX ADMIN — MAGNESIUM HYDROXIDE 30 MILLILITER(S): 400 TABLET, CHEWABLE ORAL at 18:39

## 2017-04-05 RX ADMIN — Medication 100 MILLIGRAM(S): at 20:43

## 2017-04-05 RX ADMIN — HEPARIN SODIUM 5000 UNIT(S): 5000 INJECTION INTRAVENOUS; SUBCUTANEOUS at 13:14

## 2017-04-05 RX ADMIN — HYDROMORPHONE HYDROCHLORIDE 4 MILLIGRAM(S): 2 INJECTION INTRAMUSCULAR; INTRAVENOUS; SUBCUTANEOUS at 08:00

## 2017-04-05 RX ADMIN — HEPARIN SODIUM 5000 UNIT(S): 5000 INJECTION INTRAVENOUS; SUBCUTANEOUS at 20:43

## 2017-04-05 RX ADMIN — Medication 100 MILLIGRAM(S): at 13:14

## 2017-04-05 RX ADMIN — HYDROMORPHONE HYDROCHLORIDE 4 MILLIGRAM(S): 2 INJECTION INTRAMUSCULAR; INTRAVENOUS; SUBCUTANEOUS at 07:12

## 2017-04-05 RX ADMIN — HYDROMORPHONE HYDROCHLORIDE 4 MILLIGRAM(S): 2 INJECTION INTRAMUSCULAR; INTRAVENOUS; SUBCUTANEOUS at 03:10

## 2017-04-05 RX ADMIN — Medication 100 MILLIGRAM(S): at 07:06

## 2017-04-05 RX ADMIN — HYDROMORPHONE HYDROCHLORIDE 4 MILLIGRAM(S): 2 INJECTION INTRAMUSCULAR; INTRAVENOUS; SUBCUTANEOUS at 20:43

## 2017-04-05 NOTE — PROGRESS NOTE ADULT - SUBJECTIVE AND OBJECTIVE BOX
POST OPERATIVE DAY #: 2  STATUS POST: Right tibial plateau ORIF                   SUBJECTIVE: Patient seen and examined.     Pain:  well controlled    OBJECTIVE:     Vital Signs Last 24 Hrs  T(C): 37.8, Max: 38.2 (04-04 @ 15:32)  T(F): 100.1, Max: 100.7 (04-04 @ 15:32)  HR: 110 (93 - 124)  BP: 134/64 (113/75 - 140/79)  BP(mean): --  RR: 16 (16 - 18)  SpO2: 100% (96% - 100%)    Affected extremity:          Dressing: clean/dry/intact          Sensation: intact to light touch          Motor exam:  4 / 5 Tibialis Anterior/Gastrocnemius-Soleus due to pain, compartments compressible and soft.         warm, well-perfused; capillary refill < 3 seconds              I&O's Detail  I & Os for 24h ending 05 Apr 2017 07:00  =============================================  IN:    Oral Fluid: 1470 ml    sodium chloride 0.9%.: 1000 ml    Total IN: 2470 ml  ---------------------------------------------  OUT:    Voided: 2700 ml    Total OUT: 2700 ml  ---------------------------------------------  Total NET: -230 ml    I & Os for current day (as of 05 Apr 2017 11:52)  =============================================  IN:    Oral Fluid: 480 ml    Total IN: 480 ml  ---------------------------------------------  OUT:    Voided: 800 ml    Total OUT: 800 ml  ---------------------------------------------  Total NET: -320 ml      LABS:                        11.6   8.0   )-----------( 137      ( 04 Apr 2017 07:15 )             34.7     04-04    136  |  101  |  12  ----------------------------<  105<H>  3.9   |  24  |  0.93    Ca    8.2<L>      04 Apr 2017 07:15            MEDICATIONS:    ondansetron Injectable 4milliGRAM(s) IV Push every 4 hours PRN  acetaminophen   Tablet 650milliGRAM(s) Oral every 6 hours PRN  HYDROmorphone   Tablet 2milliGRAM(s) Oral every 4 hours PRN  HYDROmorphone   Tablet 4milliGRAM(s) Oral every 4 hours PRN    heparin  Injectable 5000Unit(s) SubCutaneous every 8 hours      RADIOLOGY & ADDITIONAL STUDIES:    ASSESSMENT AND PLAN:     1. Analgesic pain control  2. DVT prophylaxis:  HSQ   3. Weight Bearing Status:  NWB   4. Disposition: Home when cleared by PT.

## 2017-04-05 NOTE — DISCHARGE NOTE ADULT - CARE PROVIDER_API CALL
Ruy Romero (MD), Orthopaedic Surgery  [address] [address]  Phone: [phone]  Fax: [phone]

## 2017-04-05 NOTE — DISCHARGE NOTE ADULT - CARE PLAN
Principal Discharge DX:	Fracture of right tibial plateau, closed, initial encounter  Goal:	Improvement in pain and ambulation after surgery  Instructions for follow-up, activity and diet:	No strenuous activity, heavy lifting, driving or returning to work until cleared by MD.  You may shower - dressing is water-resistant, no soaking in bathtubs.  Remove dressing after post op day 5-7, then leave incision open to air. Keep incision clean and dry.  Try to have regular bowel movements, take stool softener or laxative if necessary.  May take Pepcid or Zantac for upset stomach.  May take Aleve or Naproxen instead of Meloxicam.  Swelling may travel all the way down leg to foot, this is normal and will subside in a few weeks.  Call to schedule an appt with Dr. Romero for follow up, if you have staples or sutures they will be removed in office.  Contact your doctor if you experience: fever greater than 101.5, chills, chest pain, difficulty breathing, redness or excessive drainage around the incision, other concerns. Principal Discharge DX:	Fracture of right tibial plateau, closed, initial encounter  Goal:	Improvement in pain and ambulation after right tibial plateau open reduction internal fixation  Instructions for follow-up, activity and diet:	Non-weight bearing on right leg.  Keep juan brace locked in extension (do not bend your knee).  You can ice and elevate right leg for comfort.  No strenuous activity, heavy lifting, driving or returning to work until cleared by MD.  Sponge bathe.   Keep dressing on right knee clean, dry and intact.  Dr. Romero will remove your staples at the follow up appointment.  Try to have regular bowel movements, take stool softener or laxative if necessary.  Call to schedule an appt with Dr. Romero for follow up.   Contact your doctor if you experience: fever greater than 101.5, chills, chest pain, difficulty breathing, redness or excessive drainage around the incision, other concerns. Principal Discharge DX:	Fracture of right tibial plateau, closed, initial encounter  Goal:	Improvement in pain and ambulation after right tibial plateau open reduction internal fixation  Instructions for follow-up, activity and diet:	Non-weight bearing on right leg.  Keep juan brace locked in extension at all times (do not bend your knee).  You can ice and elevate right leg for comfort.  No strenuous activity, heavy lifting, driving or returning to work until cleared by MD.  Sponge bathe.   Keep dressing on right knee clean, dry and intact.  Dr. Romero will remove your staples at the follow up appointment.  Try to have regular bowel movements, take stool softener or laxative if necessary.  Call to schedule an appt with Dr. Romero for follow up.   Contact your doctor if you experience: fever greater than 101.5, chills, chest pain, difficulty breathing, redness or excessive drainage around the incision, other concerns.

## 2017-04-05 NOTE — DISCHARGE NOTE ADULT - MEDICATION SUMMARY - MEDICATIONS TO TAKE
I will START or STAY ON the medications listed below when I get home from the hospital:    HYDROmorphone 4 mg oral tablet  -- 1/2 to 1 tab(s) by mouth every 4 to 6 hours, as neede, pain MDD:6  -- Caution federal law prohibits the transfer of this drug to any person other  than the person for whom it was prescribed.  May cause drowsiness.  Alcohol may intensify this effect.  Use care when operating dangerous machinery.  This prescription cannot be refilled.  Using more of this medication than prescribed may cause serious breathing problems.    -- Indication: For moderate to severe pain    Ecotrin 325 mg oral delayed release tablet  -- 1 tab(s) by mouth once a day  -- Swallow whole.  Do not crush.  Take with food or milk.    -- Indication: For Prevent blood clots    docusate sodium 100 mg oral capsule  -- 1 cap(s) by mouth 3 times a day  -- Indication: For constipation I will START or STAY ON the medications listed below when I get home from the hospital:    HYDROmorphone 4 mg oral tablet  -- 1/2 to 1  tab(s) by mouth every 4 hours, as needed, pain MDD:6  -- Caution federal law prohibits the transfer of this drug to any person other  than the person for whom it was prescribed.  May cause drowsiness.  Alcohol may intensify this effect.  Use care when operating dangerous machinery.  This prescription cannot be refilled.  Using more of this medication than prescribed may cause serious breathing problems.    -- Indication: For moderate to severe pain    Ecotrin 325 mg oral delayed release tablet  -- 1 tab(s) by mouth once a day  -- Swallow whole.  Do not crush.  Take with food or milk.    -- Indication: For Prevent blood clots    docusate sodium 100 mg oral capsule  -- 1 cap(s) by mouth 3 times a day  -- Indication: For constipation

## 2017-04-05 NOTE — DISCHARGE NOTE ADULT - NS AS ACTIVITY OBS
No Heavy lifting/straining No Heavy lifting/straining/Do not drive or operate machinery/Do not make important decisions

## 2017-04-05 NOTE — DISCHARGE NOTE ADULT - HOSPITAL COURSE
Admitted to orthopaedic service  OR for right tibia ORIF  Periop Antibx  DVT ppx  PT   Pain mgt Admitted to orthopaedic service 4/2/17  OR for right tibia ORIF  Periop Antibx  DVT ppx  PT   Pain mgt

## 2017-04-05 NOTE — DISCHARGE NOTE ADULT - HOME CARE AGENCY
[facility] Tel [phone]  RN evaluation and home PT to start day after discharge

## 2017-04-05 NOTE — DISCHARGE NOTE ADULT - PATIENT PORTAL LINK FT
“You can access the [facility] Patient Portal, offered by [facility], by registering with the following website: [email or web address]”

## 2017-04-05 NOTE — PROGRESS NOTE ADULT - SUBJECTIVE AND OBJECTIVE BOX
SUBJECTIVE: Patient seen and examined. Pain controlled.  Pt did well o/n  No f/c/n/v/cp/sob.     OBJECTIVE:  NAD  Vital Signs Last 24 Hrs  T(C): 37.2, Max: 38.1 (04-05 @ 05:41)  T(F): 98.9, Max: 100.5 (04-05 @ 05:41)  HR: 110 (96 - 112)  BP: 127/83 (120/71 - 134/64)  BP(mean): --  RR: 16 (16 - 16)  SpO2: 97% (96% - 100%)    Affected extremity:          Dressing: clean/dry/intact  in juan brace         Sensation: SILT         Motor exam: 5/5 TA/GS/EHL         warm well perfused; capillary refill <3 seconds                         11.6   8.0   )-----------( 137      ( 04 Apr 2017 07:15 )             34.7     04-04    136  |  101  |  12  ----------------------------<  105<H>  3.9   |  24  |  0.93    Ca    8.2<L>      04 Apr 2017 07:15      A/P :  Pt is a 52yo Male s/p  R Tib ORIF  -    Pain control  -    DVT ppx: SCD/HsQ  -    Weight bearing status: NWB  -    Physical Therapy  -    Dispo: Home

## 2017-04-05 NOTE — DISCHARGE NOTE ADULT - PLAN OF CARE
Improvement in pain and ambulation after surgery No strenuous activity, heavy lifting, driving or returning to work until cleared by MD.  You may shower - dressing is water-resistant, no soaking in bathtubs.  Remove dressing after post op day 5-7, then leave incision open to air. Keep incision clean and dry.  Try to have regular bowel movements, take stool softener or laxative if necessary.  May take Pepcid or Zantac for upset stomach.  May take Aleve or Naproxen instead of Meloxicam.  Swelling may travel all the way down leg to foot, this is normal and will subside in a few weeks.  Call to schedule an appt with Dr. Romero for follow up, if you have staples or sutures they will be removed in office.  Contact your doctor if you experience: fever greater than 101.5, chills, chest pain, difficulty breathing, redness or excessive drainage around the incision, other concerns. Improvement in pain and ambulation after right tibial plateau open reduction internal fixation Non-weight bearing on right leg.  Keep juan brace locked in extension (do not bend your knee).  You can ice and elevate right leg for comfort.  No strenuous activity, heavy lifting, driving or returning to work until cleared by MD.  Sponge bathe.   Keep dressing on right knee clean, dry and intact.  Dr. Romero will remove your staples at the follow up appointment.  Try to have regular bowel movements, take stool softener or laxative if necessary.  Call to schedule an appt with Dr. Romero for follow up.   Contact your doctor if you experience: fever greater than 101.5, chills, chest pain, difficulty breathing, redness or excessive drainage around the incision, other concerns. Non-weight bearing on right leg.  Keep juan brace locked in extension at all times (do not bend your knee).  You can ice and elevate right leg for comfort.  No strenuous activity, heavy lifting, driving or returning to work until cleared by MD.  Sponge bathe.   Keep dressing on right knee clean, dry and intact.  Dr. Romero will remove your staples at the follow up appointment.  Try to have regular bowel movements, take stool softener or laxative if necessary.  Call to schedule an appt with Dr. Romero for follow up.   Contact your doctor if you experience: fever greater than 101.5, chills, chest pain, difficulty breathing, redness or excessive drainage around the incision, other concerns.

## 2017-04-05 NOTE — PROGRESS NOTE ADULT - SUBJECTIVE AND OBJECTIVE BOX
STATUS POST: [ ] Left [X] Right    ORIF right bicondylar tibial plateau fracture                     SUBJECTIVE: Patient seen and examined. [  X ]     Pt states is doing well      OBJECTIVE:     Pt in bed eating lunch in no distress    Vital Signs Last 24 Hrs  T(C): 37.8, Max: 38.2 (04-04 @ 15:32)  T(F): 100.1, Max: 100.7 (04-04 @ 15:32)  HR: 110 (93 - 124)  BP: 134/64 (113/75 - 140/79)  BP(mean): --  RR: 16 (16 - 18)  SpO2: 100% (96% - 100%)    Affected extremity:          Dressing: [X] clean/dry/intact  [ ] Other:      calf: soft, non tender  no Maren's  Edita brace in place    LABS:                        11.6   8.0   )-----------( 137      ( 04 Apr 2017 07:15 )             34.7     04-04    136  |  101  |  12  ----------------------------<  105<H>  3.9   |  24  |  0.93    Ca    8.2<L>      04 Apr 2017 07:15            MEDICATIONS    Anticoagulation:  heparin  Injectable 5000Unit(s) SubCutaneous every 8 hours      Antibiotics:       Pain medications:   ondansetron Injectable 4milliGRAM(s) IV Push every 4 hours PRN  acetaminophen   Tablet 650milliGRAM(s) Oral every 6 hours PRN  HYDROmorphone   Tablet 2milliGRAM(s) Oral every 4 hours PRN  HYDROmorphone   Tablet 4milliGRAM(s) Oral every 4 hours PRN      RADIOLOGY & ADDITIONAL STUDIES:    A/P :  influenza   Vaccine 0.5milliLiter(s) IntraMuscular once  heparin  Injectable 5000Unit(s) SubCutaneous every 8 hours  ondansetron Injectable 4milliGRAM(s) IV Push every 4 hours PRN  docusate sodium 100milliGRAM(s) Oral three times a day  simethicone 80milliGRAM(s) Chew two times a day PRN  acetaminophen   Tablet 650milliGRAM(s) Oral every 6 hours PRN  sodium chloride 0.9% Bolus 500milliLiter(s) IV Bolus once  HYDROmorphone   Tablet 2milliGRAM(s) Oral every 4 hours PRN  HYDROmorphone   Tablet 4milliGRAM(s) Oral every 4 hours PRN  sodium chloride 0.9%. 1000milliLiter(s) IV Continuous <Continuous>  magnesium hydroxide Suspension 30milliLiter(s) Oral daily PRN   heparin  Injectable 5000Unit(s) SubCutaneous every 8 hours    S/P Right [X] Left [ ]  ORIF bicondylar tibial plateau fracture  -    Pain control  -    DVT ppx: ASA81 [ ] [id number] [X] Lovenox [ ] Coumadin [ ] Heparin  [ ] Eliquis [ ] Xarelto [ ]   -     -    Weight bearing status: WBAT [ ]        PWB    [ ]     TTWB  [ ]      NWB  [X]  -    Resume home meds  -    Physical Therapy  -    Dispo: Home [X]     Rehab [ ]      KYLIE [ ]      To be determined [ ]    D/C planning  Home  NWB/ crutches/ Gilbert brace   QD until   F/U Dr. Romero's office 1-2 weeks  Pt. to call if any problems

## 2017-04-06 RX ADMIN — HYDROMORPHONE HYDROCHLORIDE 4 MILLIGRAM(S): 2 INJECTION INTRAMUSCULAR; INTRAVENOUS; SUBCUTANEOUS at 23:00

## 2017-04-06 RX ADMIN — MAGNESIUM HYDROXIDE 30 MILLILITER(S): 400 TABLET, CHEWABLE ORAL at 17:43

## 2017-04-06 RX ADMIN — Medication 100 MILLIGRAM(S): at 22:03

## 2017-04-06 RX ADMIN — HYDROMORPHONE HYDROCHLORIDE 4 MILLIGRAM(S): 2 INJECTION INTRAMUSCULAR; INTRAVENOUS; SUBCUTANEOUS at 13:03

## 2017-04-06 RX ADMIN — Medication 650 MILLIGRAM(S): at 13:04

## 2017-04-06 RX ADMIN — HYDROMORPHONE HYDROCHLORIDE 4 MILLIGRAM(S): 2 INJECTION INTRAMUSCULAR; INTRAVENOUS; SUBCUTANEOUS at 02:25

## 2017-04-06 RX ADMIN — HYDROMORPHONE HYDROCHLORIDE 4 MILLIGRAM(S): 2 INJECTION INTRAMUSCULAR; INTRAVENOUS; SUBCUTANEOUS at 22:01

## 2017-04-06 RX ADMIN — HEPARIN SODIUM 5000 UNIT(S): 5000 INJECTION INTRAVENOUS; SUBCUTANEOUS at 07:10

## 2017-04-06 RX ADMIN — HYDROMORPHONE HYDROCHLORIDE 4 MILLIGRAM(S): 2 INJECTION INTRAMUSCULAR; INTRAVENOUS; SUBCUTANEOUS at 07:10

## 2017-04-06 RX ADMIN — Medication 100 MILLIGRAM(S): at 07:10

## 2017-04-06 RX ADMIN — HYDROMORPHONE HYDROCHLORIDE 4 MILLIGRAM(S): 2 INJECTION INTRAMUSCULAR; INTRAVENOUS; SUBCUTANEOUS at 17:43

## 2017-04-06 RX ADMIN — HEPARIN SODIUM 5000 UNIT(S): 5000 INJECTION INTRAVENOUS; SUBCUTANEOUS at 22:02

## 2017-04-06 RX ADMIN — Medication 100 MILLIGRAM(S): at 13:04

## 2017-04-06 RX ADMIN — HYDROMORPHONE HYDROCHLORIDE 4 MILLIGRAM(S): 2 INJECTION INTRAMUSCULAR; INTRAVENOUS; SUBCUTANEOUS at 03:03

## 2017-04-06 RX ADMIN — HYDROMORPHONE HYDROCHLORIDE 4 MILLIGRAM(S): 2 INJECTION INTRAMUSCULAR; INTRAVENOUS; SUBCUTANEOUS at 18:43

## 2017-04-06 RX ADMIN — HYDROMORPHONE HYDROCHLORIDE 4 MILLIGRAM(S): 2 INJECTION INTRAMUSCULAR; INTRAVENOUS; SUBCUTANEOUS at 08:11

## 2017-04-06 RX ADMIN — HYDROMORPHONE HYDROCHLORIDE 4 MILLIGRAM(S): 2 INJECTION INTRAMUSCULAR; INTRAVENOUS; SUBCUTANEOUS at 14:05

## 2017-04-06 RX ADMIN — HEPARIN SODIUM 5000 UNIT(S): 5000 INJECTION INTRAVENOUS; SUBCUTANEOUS at 13:03

## 2017-04-06 NOTE — PROGRESS NOTE ADULT - SUBJECTIVE AND OBJECTIVE BOX
SUBJECTIVE: Patient seen and examined. Pain controlled.  Pt did well o/n  No f/c/n/v/cp/sob.     OBJECTIVE:  NAD  Vital Signs Last 24 Hrs  T(C): 37.2, Max: 38.1 (04-05 @ 15:41)  T(F): 98.9, Max: 100.5 (04-05 @ 15:41)  HR: 110 (96 - 112)  BP: 127/83 (120/71 - 134/64)  BP(mean): --  RR: 16 (16 - 16)  SpO2: 97% (96% - 100%)    Affected extremity:          Dressing: clean/dry/intact  in juan brace         Sensation: SILT         Motor exam: 5/5 TA/GS/EHL         warm well perfused; capillary refill <3 seconds              no new labs o/n      A/P :  Pt is a 52yo Male s/p  R Tib ORIF  -    Pain control  -    DVT ppx: SCD/HsQ  -    Weight bearing status: NWB  -    Physical Therapy  -    Dispo: Home

## 2017-04-06 NOTE — DIETITIAN INITIAL EVALUATION ADULT. - OTHER INFO
50y/o M admitted with R tibial plateau fx s/p R tibial ORIF. Pt seen asleep in bed, completely covered by bedsheet. Pt reports he is tired and declines RD assessment. Per CNA, pt with good appetite and intake. No apparent GI distress or mechanical issues noted. Continue with regular diet and will follow per policy.

## 2017-04-06 NOTE — PROGRESS NOTE ADULT - SUBJECTIVE AND OBJECTIVE BOX
ORTHO NOTE    [x ] Pt seen/examined.  [x ] Pt without any complaints/in NAD.    [ ] Pt complains of:      ROS: [ ] Fever  [ ] Chills  [ ] CP [ ] SOB [ ] Dysnea  [ ] Palpitations [ ] Cough [ ] N/V/C/D [ ] Paresthia [ ] Other     [ x] ROS  otherwise negative    .    PHYSICAL EXAM:    Vital Signs Last 24 Hrs  T(C): 37.9, Max: 37.9 (04-06 @ 08:56)  T(F): 100.2, Max: 100.2 (04-06 @ 08:56)  HR: 111 (110 - 112)  BP: 121/65 (120/77 - 127/83)  BP(mean): --  RR: 16 (16 - 16)  SpO2: 96% (96% - 97%)    I&O's Detail  I & Os for 24h ending 06 Apr 2017 07:00  =============================================  IN:    Oral Fluid: 1160 ml    Total IN: 1160 ml  ---------------------------------------------  OUT:    Voided: 2050 ml    Total OUT: 2050 ml  ---------------------------------------------  Total NET: -890 ml    I & Os for current day (as of 06 Apr 2017 15:54)  =============================================  IN:    Oral Fluid: 900 ml    Total IN: 900 ml  ---------------------------------------------  OUT:    Voided: 1150 ml    Total OUT: 1150 ml  ---------------------------------------------  Total NET: -250 ml       CAPILLARY BLOOD GLUCOSE                  Neuro: AAOx3    Lungs: CTA, IS demonstrated    CV:    ABD: soft, nontender    Ext: right knee +edema, right LE +edema, R LE NVID pulses palpable, right LE elevated and ice therapy applied    LABS                                       [ ] Other Labs  [ ] None ordered            Please check or [place] when present:  •  Heart Failure:    [ ] Acute        [ ]  Acute on Chronic        [ ] Chronic         [ ] Diastolic     [ ]  Combined    •  RHEA:     [ ] ATN        [ ]  Renal medullary necrosis       [ ]  Renal cortical necrosis                  [ ] Other pathological Lesion:  •  CKD:  [ ] Stage I   [ ] Stage II  [ ] Stage III    [ ]Stage IV   [ ]  CKD V   [ ]  Other/Unspecified:    •  Abdominal Nutritional Status:   [ ] Malnutrition-See Nutrition note    [ ] Cachexia   [ ]  Other        [ ] Supplement ordered:            [ ] Morbid Obesity: BMI >=40         ASSESSMENT/PLAN:      STATUS POST: R tibial plateau ORIF  Continue to progress with physical therapy  CONTINUE:          [ ] PT- NWB R LE    [ ] DVT PPX- scd boots, subqH    [ ] Pain Mgt- PO meds    [ ] Dispo plan- home, HPT

## 2017-04-06 NOTE — DIETITIAN INITIAL EVALUATION ADULT. - ENERGY NEEDS
IBW 70Kg  %%  BMI 30.4    Utilized IBW to calculate needs, pt >120% of IBW. Pro increased for post-op/healing.

## 2017-04-07 PROCEDURE — 99232 SBSQ HOSP IP/OBS MODERATE 35: CPT | Mod: GC

## 2017-04-07 RX ADMIN — HEPARIN SODIUM 5000 UNIT(S): 5000 INJECTION INTRAVENOUS; SUBCUTANEOUS at 14:41

## 2017-04-07 RX ADMIN — HYDROMORPHONE HYDROCHLORIDE 4 MILLIGRAM(S): 2 INJECTION INTRAMUSCULAR; INTRAVENOUS; SUBCUTANEOUS at 07:15

## 2017-04-07 RX ADMIN — HYDROMORPHONE HYDROCHLORIDE 4 MILLIGRAM(S): 2 INJECTION INTRAMUSCULAR; INTRAVENOUS; SUBCUTANEOUS at 12:33

## 2017-04-07 RX ADMIN — HYDROMORPHONE HYDROCHLORIDE 4 MILLIGRAM(S): 2 INJECTION INTRAMUSCULAR; INTRAVENOUS; SUBCUTANEOUS at 16:17

## 2017-04-07 RX ADMIN — Medication 100 MILLIGRAM(S): at 14:41

## 2017-04-07 RX ADMIN — Medication 100 MILLIGRAM(S): at 06:21

## 2017-04-07 RX ADMIN — HEPARIN SODIUM 5000 UNIT(S): 5000 INJECTION INTRAVENOUS; SUBCUTANEOUS at 06:21

## 2017-04-07 RX ADMIN — HYDROMORPHONE HYDROCHLORIDE 4 MILLIGRAM(S): 2 INJECTION INTRAMUSCULAR; INTRAVENOUS; SUBCUTANEOUS at 04:00

## 2017-04-07 RX ADMIN — HYDROMORPHONE HYDROCHLORIDE 4 MILLIGRAM(S): 2 INJECTION INTRAMUSCULAR; INTRAVENOUS; SUBCUTANEOUS at 17:17

## 2017-04-07 RX ADMIN — HEPARIN SODIUM 5000 UNIT(S): 5000 INJECTION INTRAVENOUS; SUBCUTANEOUS at 20:36

## 2017-04-07 RX ADMIN — HYDROMORPHONE HYDROCHLORIDE 4 MILLIGRAM(S): 2 INJECTION INTRAMUSCULAR; INTRAVENOUS; SUBCUTANEOUS at 08:00

## 2017-04-07 RX ADMIN — HYDROMORPHONE HYDROCHLORIDE 4 MILLIGRAM(S): 2 INJECTION INTRAMUSCULAR; INTRAVENOUS; SUBCUTANEOUS at 21:30

## 2017-04-07 RX ADMIN — HYDROMORPHONE HYDROCHLORIDE 4 MILLIGRAM(S): 2 INJECTION INTRAMUSCULAR; INTRAVENOUS; SUBCUTANEOUS at 11:33

## 2017-04-07 RX ADMIN — HYDROMORPHONE HYDROCHLORIDE 4 MILLIGRAM(S): 2 INJECTION INTRAMUSCULAR; INTRAVENOUS; SUBCUTANEOUS at 03:00

## 2017-04-07 RX ADMIN — HYDROMORPHONE HYDROCHLORIDE 4 MILLIGRAM(S): 2 INJECTION INTRAMUSCULAR; INTRAVENOUS; SUBCUTANEOUS at 20:36

## 2017-04-07 NOTE — PROGRESS NOTE ADULT - PROBLEM SELECTOR PLAN 3
due to pain and observe for now.   Is no indication for on the line thromboembol. The for now
due to pain and observe for now.   There is no indication for on the line thromboembolic
due to pain and observe for now

## 2017-04-07 NOTE — PROGRESS NOTE ADULT - SUBJECTIVE AND OBJECTIVE BOX
SUBJECTIVE: Patient seen and examined. Pain controlled.  Pt did well o/n  No f/c/n/v/cp/sob.     OBJECTIVE:  NAD  Vital Signs Last 24 Hrs  T(C): 36.7, Max: 37.9 (04-06 @ 08:56)  T(F): 98.1, Max: 100.2 (04-06 @ 08:56)  HR: 105 (104 - 111)  BP: 116/78 (116/78 - 126/76)  BP(mean): --  RR: 16 (16 - 16)  SpO2: 98% (96% - 98%)    Affected extremity:          Dressing: clean/dry/intact            Sensation: SILT         Motor exam: 5/5 TA/GS/EHL         warm well perfused; capillary refill <3 seconds     A/P :  Pt is a 52yo Male s/p  R tibORIF  -    Pain control  -    DVT ppx: SCD/HSQ   -    Weight bearing status: NWB   -    Physical Therapy  -    Dispo: Home

## 2017-04-07 NOTE — PROGRESS NOTE ADULT - PROBLEM SELECTOR PROBLEM 1
GERD (gastroesophageal reflux disease)

## 2017-04-07 NOTE — PROGRESS NOTE ADULT - PROBLEM SELECTOR PLAN 2
he is clinically stable postoperatively. SCD and SQ heparin.  Pain is controlled start mobilization
he is clinically stable postoperatively. SCD and SQ heparin.  Pain is controlled start mobilization
he is clinically stable postoperatively. SCD and SQ heparin.  Pain is controlled start mobilzation

## 2017-04-07 NOTE — PROGRESS NOTE ADULT - SUBJECTIVE AND OBJECTIVE BOX
ORTHO NOTE    [x ] Pt seen/examined.  [ ] Pt without any complaints/in NAD.    [x ] Pt complains of: "I want my knee records"      ROS: [ ] Fever  [ ] Chills  [ ] CP [ ] SOB [ ] Dysnea  [ ] Palpitations [ ] Cough [ ] N/V/C/D [ ] Paresthia [ ] Other     [x ] ROS  otherwise negative    .    PHYSICAL EXAM:    Vital Signs Last 24 Hrs  T(C): 37.5, Max: 37.7 (04-06 @ 16:03)  T(F): 99.5, Max: 99.9 (04-06 @ 16:03)  HR: 98 (98 - 105)  BP: 135/79 (108/75 - 135/79)  BP(mean): --  RR: 18 (16 - 18)  SpO2: 96% (96% - 98%)    I&O's Detail    I & Os for current day (as of 07 Apr 2017 15:29)  =============================================  IN:    Oral Fluid: 900 ml    Total IN: 900 ml  ---------------------------------------------  OUT:    Voided: 2250 ml    Total OUT: 2250 ml  ---------------------------------------------  Total NET: -1350 ml       CAPILLARY BLOOD GLUCOSE                  Neuro: AAOX3    Lungs: CTA, IS demonstrated    CV:     ABD: soft, nontender     Ext: right knee +edema, right LE  +edema pulse on R LE is palpable; right knee juan brace locked in extension; R LE elevated and ice applied    LABS                                       [ ] Other Labs  [ ] None ordered            Please check or [place] when present:  •  Heart Failure:    [ ] Acute        [ ]  Acute on Chronic        [ ] Chronic         [ ] Diastolic     [ ]  Combined    •  RHEA:     [ ] ATN        [ ]  Renal medullary necrosis       [ ]  Renal cortical necrosis                  [ ] Other pathological Lesion:  •  CKD:  [ ] Stage I   [ ] Stage II  [ ] Stage III    [ ]Stage IV   [ ]  CKD V   [ ]  Other/Unspecified:    •  Abdominal Nutritional Status:   [ ] Malnutrition-See Nutrition note    [ ] Cachexia   [ ]  Other        [ ] Supplement ordered:            [ ] Morbid Obesity: BMI >=40         ASSESSMENT/PLAN:      STATUS POST: R tibial plateau ORIF  Patient continues to require additional PT to negotiate stairs    CONTINUE:          [ ] PT- NWB juan locked in extension    [ ] DVT PPX- scd boots, subqH    [ ] Pain Mgt- PO meds    [ ] Dispo plan- home, HPT

## 2017-04-07 NOTE — PROGRESS NOTE ADULT - SUBJECTIVE AND OBJECTIVE BOX
Interval Events:  Patient seen and examined at bedside.    Patient is a 51y old  Male who presents with a chief complaint of right tibial plateau fracture (05 Apr 2017 11:46)      PAST MEDICAL & SURGICAL HISTORY:      MEDICATIONS:  Pulmonary:    Antimicrobials:    Anticoagulants:  heparin  Injectable 5000Unit(s) SubCutaneous every 8 hours    Cardiac:      Allergies    No Known Allergies    Intolerances        Vital Signs Last 24 Hrs  T(C): 36.7, Max: 37.5 (04-07 @ 15:24)  T(F): 98.1, Max: 99.5 (04-07 @ 15:24)  HR: 95 (95 - 105)  BP: 115/76 (108/75 - 135/79)  BP(mean): --  RR: 18 (16 - 18)  SpO2: 98% (96% - 98%)  I & Os for 24h ending 04-07 @ 07:00  =============================================  IN: 900 ml / OUT: 2250 ml / NET: -1350 ml    I & Os for current day (as of 04-07 @ 22:58)  =============================================  IN: 0 ml / OUT: 400 ml / NET: -400 ml        LABS:                                  RADIOLOGY & ADDITIONAL STUDIES (The following images were personally reviewed):  Balderrama:                            Yes        No  Urine output:               Yes        No  DVT prophylaxis:         Yes        No  Flattus:                          Yes        No  Bowel movement:       Yes        No Interval Events: reviewed  Patient seen and examined at bedside.    Patient is a 51y old  Male who presents with a chief complaint of right tibial plateau fracture (05 Apr 2017 11:46)    he is doing OK and pain is better controlled  PAST MEDICAL & SURGICAL HISTORY:      MEDICATIONS:  Pulmonary:    Antimicrobials:    Anticoagulants:  heparin  Injectable 5000Unit(s) SubCutaneous every 8 hours    Cardiac:      Allergies    No Known Allergies    Intolerances        Vital Signs Last 24 Hrs  T(C): 36.7, Max: 37.5 (04-07 @ 15:24)  T(F): 98.1, Max: 99.5 (04-07 @ 15:24)  HR: 95 (95 - 105)  BP: 115/76 (108/75 - 135/79)  BP(mean): --  RR: 18 (16 - 18)  SpO2: 98% (96% - 98%)  I & Os for 24h ending 04-07 @ 07:00  =============================================  IN: 900 ml / OUT: 2250 ml / NET: -1350 ml    I & Os for current day (as of 04-07 @ 22:58)  =============================================  IN: 0 ml / OUT: 400 ml / NET: -400 ml        LABS:                                  RADIOLOGY & ADDITIONAL STUDIES (The following images were personally reviewed):  Balderrama:                                    No  Urine output:               Yes         DVT prophylaxis:         Yes          Flattus:                          Yes          Bowel movement:       Yes

## 2017-04-08 RX ADMIN — Medication 100 MILLIGRAM(S): at 20:37

## 2017-04-08 RX ADMIN — HYDROMORPHONE HYDROCHLORIDE 4 MILLIGRAM(S): 2 INJECTION INTRAMUSCULAR; INTRAVENOUS; SUBCUTANEOUS at 05:31

## 2017-04-08 RX ADMIN — HYDROMORPHONE HYDROCHLORIDE 4 MILLIGRAM(S): 2 INJECTION INTRAMUSCULAR; INTRAVENOUS; SUBCUTANEOUS at 19:42

## 2017-04-08 RX ADMIN — HYDROMORPHONE HYDROCHLORIDE 4 MILLIGRAM(S): 2 INJECTION INTRAMUSCULAR; INTRAVENOUS; SUBCUTANEOUS at 11:10

## 2017-04-08 RX ADMIN — HYDROMORPHONE HYDROCHLORIDE 4 MILLIGRAM(S): 2 INJECTION INTRAMUSCULAR; INTRAVENOUS; SUBCUTANEOUS at 06:30

## 2017-04-08 RX ADMIN — HYDROMORPHONE HYDROCHLORIDE 4 MILLIGRAM(S): 2 INJECTION INTRAMUSCULAR; INTRAVENOUS; SUBCUTANEOUS at 18:42

## 2017-04-08 RX ADMIN — HYDROMORPHONE HYDROCHLORIDE 4 MILLIGRAM(S): 2 INJECTION INTRAMUSCULAR; INTRAVENOUS; SUBCUTANEOUS at 10:10

## 2017-04-08 RX ADMIN — HEPARIN SODIUM 5000 UNIT(S): 5000 INJECTION INTRAVENOUS; SUBCUTANEOUS at 05:30

## 2017-04-08 RX ADMIN — HYDROMORPHONE HYDROCHLORIDE 4 MILLIGRAM(S): 2 INJECTION INTRAMUSCULAR; INTRAVENOUS; SUBCUTANEOUS at 23:00

## 2017-04-08 RX ADMIN — HYDROMORPHONE HYDROCHLORIDE 4 MILLIGRAM(S): 2 INJECTION INTRAMUSCULAR; INTRAVENOUS; SUBCUTANEOUS at 14:33

## 2017-04-08 RX ADMIN — HEPARIN SODIUM 5000 UNIT(S): 5000 INJECTION INTRAVENOUS; SUBCUTANEOUS at 20:37

## 2017-04-08 RX ADMIN — Medication 100 MILLIGRAM(S): at 05:31

## 2017-04-08 RX ADMIN — HYDROMORPHONE HYDROCHLORIDE 4 MILLIGRAM(S): 2 INJECTION INTRAMUSCULAR; INTRAVENOUS; SUBCUTANEOUS at 22:29

## 2017-04-08 RX ADMIN — HYDROMORPHONE HYDROCHLORIDE 4 MILLIGRAM(S): 2 INJECTION INTRAMUSCULAR; INTRAVENOUS; SUBCUTANEOUS at 15:33

## 2017-04-08 RX ADMIN — HEPARIN SODIUM 5000 UNIT(S): 5000 INJECTION INTRAVENOUS; SUBCUTANEOUS at 14:33

## 2017-04-08 NOTE — PROGRESS NOTE ADULT - SUBJECTIVE AND OBJECTIVE BOX
SUBJECTIVE: Patient seen and examined.  No issues overnight. Pain well controlled. Denies CP/SOB    OBJECTIVE:     Vital Signs Last 24 Hrs  T(C): 36.3, Max: 37.5 (04-07 @ 15:24)  T(F): 97.4, Max: 99.5 (04-07 @ 15:24)  HR: 95 (95 - 103)  BP: 138/85 (108/75 - 138/85)  BP(mean): --  RR: 17 (17 - 18)  SpO2: 99% (96% - 99%)         RLE         Dressing: clean/dry/intact            Motor exam:  TA 5/5 EHL 5/5 GSC 5/5          Sensation:   T SILT SPH SILT DP SILT         Vascular:  Warm/pink/well perfused             LABS:                MEDICATIONS:  MEDICATIONS  (STANDING):  influenza   Vaccine 0.5milliLiter(s) IntraMuscular once  heparin  Injectable 5000Unit(s) SubCutaneous every 8 hours  docusate sodium 100milliGRAM(s) Oral three times a day  sodium chloride 0.9% Bolus 500milliLiter(s) IV Bolus once  sodium chloride 0.9%. 1000milliLiter(s) IV Continuous <Continuous>    MEDICATIONS  (PRN):  ondansetron Injectable 4milliGRAM(s) IV Push every 4 hours PRN Nausea and/or Vomiting  simethicone 80milliGRAM(s) Chew two times a day PRN Gas  acetaminophen   Tablet 650milliGRAM(s) Oral every 6 hours PRN For Temp greater than 38 C (100.4 F)  HYDROmorphone   Tablet 2milliGRAM(s) Oral every 4 hours PRN Moderate Pain (4 - 6)  HYDROmorphone   Tablet 4milliGRAM(s) Oral every 4 hours PRN Severe Pain (7 - 10)  magnesium hydroxide Suspension 30milliLiter(s) Oral daily PRN Constipation      Anticoagulation:  heparin  Injectable 5000Unit(s) SubCutaneous every 8 hours      Antibiotics:       Pain medications:   ondansetron Injectable 4milliGRAM(s) IV Push every 4 hours PRN  acetaminophen   Tablet 650milliGRAM(s) Oral every 6 hours PRN  HYDROmorphone   Tablet 2milliGRAM(s) Oral every 4 hours PRN  HYDROmorphone   Tablet 4milliGRAM(s) Oral every 4 hours PRN        A/P :   s/p   R Tibial plateau ORIF   -    Pain control  -    DVT ppx: heparin  Injectable 5000Unit(s) SubCutaneous every 8 hours  -    Weight bearing status:  NWB in [place]  -    Resume home meds  -    Dispo: TBD

## 2017-04-09 RX ADMIN — HYDROMORPHONE HYDROCHLORIDE 4 MILLIGRAM(S): 2 INJECTION INTRAMUSCULAR; INTRAVENOUS; SUBCUTANEOUS at 02:17

## 2017-04-09 RX ADMIN — HYDROMORPHONE HYDROCHLORIDE 4 MILLIGRAM(S): 2 INJECTION INTRAMUSCULAR; INTRAVENOUS; SUBCUTANEOUS at 11:30

## 2017-04-09 RX ADMIN — HYDROMORPHONE HYDROCHLORIDE 4 MILLIGRAM(S): 2 INJECTION INTRAMUSCULAR; INTRAVENOUS; SUBCUTANEOUS at 08:00

## 2017-04-09 RX ADMIN — HYDROMORPHONE HYDROCHLORIDE 4 MILLIGRAM(S): 2 INJECTION INTRAMUSCULAR; INTRAVENOUS; SUBCUTANEOUS at 16:00

## 2017-04-09 RX ADMIN — HYDROMORPHONE HYDROCHLORIDE 4 MILLIGRAM(S): 2 INJECTION INTRAMUSCULAR; INTRAVENOUS; SUBCUTANEOUS at 15:11

## 2017-04-09 RX ADMIN — HEPARIN SODIUM 5000 UNIT(S): 5000 INJECTION INTRAVENOUS; SUBCUTANEOUS at 21:40

## 2017-04-09 RX ADMIN — HYDROMORPHONE HYDROCHLORIDE 4 MILLIGRAM(S): 2 INJECTION INTRAMUSCULAR; INTRAVENOUS; SUBCUTANEOUS at 19:17

## 2017-04-09 RX ADMIN — HYDROMORPHONE HYDROCHLORIDE 4 MILLIGRAM(S): 2 INJECTION INTRAMUSCULAR; INTRAVENOUS; SUBCUTANEOUS at 20:00

## 2017-04-09 RX ADMIN — HYDROMORPHONE HYDROCHLORIDE 4 MILLIGRAM(S): 2 INJECTION INTRAMUSCULAR; INTRAVENOUS; SUBCUTANEOUS at 23:11

## 2017-04-09 RX ADMIN — HYDROMORPHONE HYDROCHLORIDE 4 MILLIGRAM(S): 2 INJECTION INTRAMUSCULAR; INTRAVENOUS; SUBCUTANEOUS at 06:12

## 2017-04-09 RX ADMIN — HEPARIN SODIUM 5000 UNIT(S): 5000 INJECTION INTRAVENOUS; SUBCUTANEOUS at 15:11

## 2017-04-09 RX ADMIN — HYDROMORPHONE HYDROCHLORIDE 4 MILLIGRAM(S): 2 INJECTION INTRAMUSCULAR; INTRAVENOUS; SUBCUTANEOUS at 03:00

## 2017-04-09 RX ADMIN — HEPARIN SODIUM 5000 UNIT(S): 5000 INJECTION INTRAVENOUS; SUBCUTANEOUS at 05:40

## 2017-04-09 RX ADMIN — HYDROMORPHONE HYDROCHLORIDE 4 MILLIGRAM(S): 2 INJECTION INTRAMUSCULAR; INTRAVENOUS; SUBCUTANEOUS at 23:59

## 2017-04-09 RX ADMIN — HYDROMORPHONE HYDROCHLORIDE 4 MILLIGRAM(S): 2 INJECTION INTRAMUSCULAR; INTRAVENOUS; SUBCUTANEOUS at 10:57

## 2017-04-09 NOTE — PROGRESS NOTE ADULT - SUBJECTIVE AND OBJECTIVE BOX
SUBJECTIVE: Patient seen and examined.  No issues overnight. Pain well controlled. Denies CP/SOB    OBJECTIVE:     Vital Signs Last 24 Hrs  T(C): 37, Max: 37.1 (04-08 @ 20:23)  T(F): 98.6, Max: 98.7 (04-08 @ 20:23)  HR: 98 (86 - 99)  BP: 112/75 (104/68 - 117/77)  BP(mean): --  RR: 18 (16 - 18)  SpO2: 98% (97% - 98%)        RLE           Dressing: clean/dry/intact            Motor exam:  TA 5/5 EHL 5/5 GSC 5/5          Sensation:   T SILT SPH SILT DP SILT         Vascular:  Warm/pink/well perfused             LABS:                MEDICATIONS:  MEDICATIONS  (STANDING):  influenza   Vaccine 0.5milliLiter(s) IntraMuscular once  heparin  Injectable 5000Unit(s) SubCutaneous every 8 hours  docusate sodium 100milliGRAM(s) Oral three times a day  sodium chloride 0.9% Bolus 500milliLiter(s) IV Bolus once  sodium chloride 0.9%. 1000milliLiter(s) IV Continuous <Continuous>    MEDICATIONS  (PRN):  ondansetron Injectable 4milliGRAM(s) IV Push every 4 hours PRN Nausea and/or Vomiting  simethicone 80milliGRAM(s) Chew two times a day PRN Gas  acetaminophen   Tablet 650milliGRAM(s) Oral every 6 hours PRN For Temp greater than 38 C (100.4 F)  HYDROmorphone   Tablet 2milliGRAM(s) Oral every 4 hours PRN Moderate Pain (4 - 6)  HYDROmorphone   Tablet 4milliGRAM(s) Oral every 4 hours PRN Severe Pain (7 - 10)  magnesium hydroxide Suspension 30milliLiter(s) Oral daily PRN Constipation      Anticoagulation:  heparin  Injectable 5000Unit(s) SubCutaneous every 8 hours      Antibiotics:       Pain medications:   ondansetron Injectable 4milliGRAM(s) IV Push every 4 hours PRN  acetaminophen   Tablet 650milliGRAM(s) Oral every 6 hours PRN  HYDROmorphone   Tablet 2milliGRAM(s) Oral every 4 hours PRN  HYDROmorphone   Tablet 4milliGRAM(s) Oral every 4 hours PRN        A/P :   s/p   R Tibial plateau ORIF  -    Pain control  -    DVT ppx: heparin  Injectable 5000Unit(s) SubCutaneous every 8 hours  -    Weight bearing status:  NWB  -    Physical Therapy  -    Dispo: Home pending PT clearance

## 2017-04-10 RX ORDER — DOCUSATE SODIUM 100 MG
1 CAPSULE ORAL
Qty: 0 | Refills: 0 | COMMUNITY
Start: 2017-04-10

## 2017-04-10 RX ORDER — ASPIRIN/CALCIUM CARB/MAGNESIUM 324 MG
1 TABLET ORAL
Qty: 30 | Refills: 0 | OUTPATIENT
Start: 2017-04-10 | End: 2017-05-10

## 2017-04-10 RX ORDER — HYDROMORPHONE HYDROCHLORIDE 2 MG/ML
1 INJECTION INTRAMUSCULAR; INTRAVENOUS; SUBCUTANEOUS
Qty: 42 | Refills: 0 | OUTPATIENT
Start: 2017-04-10 | End: 2017-04-17

## 2017-04-10 RX ADMIN — HYDROMORPHONE HYDROCHLORIDE 4 MILLIGRAM(S): 2 INJECTION INTRAMUSCULAR; INTRAVENOUS; SUBCUTANEOUS at 22:39

## 2017-04-10 RX ADMIN — HEPARIN SODIUM 5000 UNIT(S): 5000 INJECTION INTRAVENOUS; SUBCUTANEOUS at 05:44

## 2017-04-10 RX ADMIN — HYDROMORPHONE HYDROCHLORIDE 4 MILLIGRAM(S): 2 INJECTION INTRAMUSCULAR; INTRAVENOUS; SUBCUTANEOUS at 03:02

## 2017-04-10 RX ADMIN — HYDROMORPHONE HYDROCHLORIDE 4 MILLIGRAM(S): 2 INJECTION INTRAMUSCULAR; INTRAVENOUS; SUBCUTANEOUS at 03:50

## 2017-04-10 RX ADMIN — HYDROMORPHONE HYDROCHLORIDE 4 MILLIGRAM(S): 2 INJECTION INTRAMUSCULAR; INTRAVENOUS; SUBCUTANEOUS at 10:40

## 2017-04-10 RX ADMIN — HEPARIN SODIUM 5000 UNIT(S): 5000 INJECTION INTRAVENOUS; SUBCUTANEOUS at 21:54

## 2017-04-10 RX ADMIN — HEPARIN SODIUM 5000 UNIT(S): 5000 INJECTION INTRAVENOUS; SUBCUTANEOUS at 16:51

## 2017-04-10 RX ADMIN — HYDROMORPHONE HYDROCHLORIDE 4 MILLIGRAM(S): 2 INJECTION INTRAMUSCULAR; INTRAVENOUS; SUBCUTANEOUS at 17:27

## 2017-04-10 RX ADMIN — HYDROMORPHONE HYDROCHLORIDE 4 MILLIGRAM(S): 2 INJECTION INTRAMUSCULAR; INTRAVENOUS; SUBCUTANEOUS at 21:54

## 2017-04-10 RX ADMIN — HYDROMORPHONE HYDROCHLORIDE 4 MILLIGRAM(S): 2 INJECTION INTRAMUSCULAR; INTRAVENOUS; SUBCUTANEOUS at 11:30

## 2017-04-10 RX ADMIN — HYDROMORPHONE HYDROCHLORIDE 4 MILLIGRAM(S): 2 INJECTION INTRAMUSCULAR; INTRAVENOUS; SUBCUTANEOUS at 16:51

## 2017-04-10 NOTE — PROGRESS NOTE ADULT - SUBJECTIVE AND OBJECTIVE BOX
SUBJECTIVE: Patient seen and examined.  No issues overnight. Pain well controlled. Denies CP/SOB    OBJECTIVE:     Vital Signs Last 24 Hrs  T(C): 36.9, Max: 37.1 (04-10 @ 16:14)  T(F): 98.4, Max: 98.8 (04-10 @ 16:14)  HR: 98 (87 - 100)  BP: 118/78 (109/68 - 120/80)  BP(mean): --  RR: 16 (15 - 16)  SpO2: 97% (96% - 99%)        RLE           Dressing: clean/dry/intact            Motor exam:  TA 5/5 EHL 5/5 GSC 5/5          Sensation:   T SILT SPH SILT DP SILT         Vascular:  Warm/pink/well perfused             LABS: no new labs      A/P :   s/p   R Tibial plateau ORIF  -    Pain control  -    DVT ppx: heparin  Injectable 5000Unit(s) SubCutaneous every 8 hours  -    Weight bearing status:  NWB  -    Physical Therapy  -    Dispo: Home pending PT clearance

## 2017-04-10 NOTE — PROGRESS NOTE ADULT - SUBJECTIVE AND OBJECTIVE BOX
ORTHO NOTE    [x ] Pt seen/examined.  [ x] Pt without any complaints/in NAD.    [ ] Pt complains of:      ROS: [ ] Fever  [ ] Chills  [ ] CP [ ] SOB [ ] Dysnea  [ ] Palpitations [ ] Cough [ ] N/V/C/D [ ] Paresthia [ ] Other     [ x] ROS  otherwise negative    .    PHYSICAL EXAM:    Vital Signs Last 24 Hrs  T(C): 36.1, Max: 37.2 (04-09 @ 21:14)  T(F): 97, Max: 99 (04-09 @ 21:14)  HR: 99 (87 - 101)  BP: 109/68 (109/68 - 116/75)  BP(mean): --  RR: 15 (15 - 16)  SpO2: 96% (96% - 99%)    I&O's Detail  I & Os for 24h ending 10 Apr 2017 07:00  =============================================  IN:    Oral Fluid: 480 ml    Total IN: 480 ml  ---------------------------------------------  OUT:    Voided: 950 ml    Total OUT: 950 ml  ---------------------------------------------  Total NET: -470 ml    I & Os for current day (as of 10 Apr 2017 13:46)  =============================================  IN:    Oral Fluid: 720 ml    Total IN: 720 ml  ---------------------------------------------  OUT:    Voided: 850 ml    Total OUT: 850 ml  ---------------------------------------------  Total NET: -130 ml       CAPILLARY BLOOD GLUCOSE                  Neuro: AAOx3     Lungs: CTA, IS demonstrated    CV:    ABD: soft, nontender    Ext: right LE NVID, right LE in juan brace and elevated, +edema R knee and LE    LABS                                       [ ] Other Labs  [ ] None ordered            Please check or [place] when present:  •  Heart Failure:    [ ] Acute        [ ]  Acute on Chronic        [ ] Chronic         [ ] Diastolic     [ ]  Combined    •  RHEA:     [ ] ATN        [ ]  Renal medullary necrosis       [ ]  Renal cortical necrosis                  [ ] Other pathological Lesion:  •  CKD:  [ ] Stage I   [ ] Stage II  [ ] Stage III    [ ]Stage IV   [ ]  CKD V   [ ]  Other/Unspecified:    •  Abdominal Nutritional Status:   [ ] Malnutrition-See Nutrition note    [ ] Cachexia   [ ]  Other        [ ] Supplement ordered:            [ ] Morbid Obesity: BMI >=40         ASSESSMENT/PLAN:      STATUS POST: R tibial plateau ORIF    CONTINUE:          [ ] PT- NWB R LE with juan brace    [ ] DVT PPX- subqH q 8    [ ] Pain Mgt- PO meds    [ ] Dispo plan- home, pending PT clearance

## 2017-04-11 VITALS
RESPIRATION RATE: 15 BRPM | TEMPERATURE: 100 F | OXYGEN SATURATION: 97 % | HEART RATE: 90 BPM | SYSTOLIC BLOOD PRESSURE: 125 MMHG | DIASTOLIC BLOOD PRESSURE: 67 MMHG

## 2017-04-11 PROCEDURE — 97162 PT EVAL MOD COMPLEX 30 MIN: CPT

## 2017-04-11 PROCEDURE — 76000 FLUOROSCOPY <1 HR PHYS/QHP: CPT

## 2017-04-11 PROCEDURE — 86900 BLOOD TYPING SEROLOGIC ABO: CPT

## 2017-04-11 PROCEDURE — C1713: CPT

## 2017-04-11 PROCEDURE — 99285 EMERGENCY DEPT VISIT HI MDM: CPT

## 2017-04-11 PROCEDURE — 80048 BASIC METABOLIC PNL TOTAL CA: CPT

## 2017-04-11 PROCEDURE — 81001 URINALYSIS AUTO W/SCOPE: CPT

## 2017-04-11 PROCEDURE — 97116 GAIT TRAINING THERAPY: CPT

## 2017-04-11 PROCEDURE — 86901 BLOOD TYPING SEROLOGIC RH(D): CPT

## 2017-04-11 PROCEDURE — 85025 COMPLETE CBC W/AUTO DIFF WBC: CPT

## 2017-04-11 PROCEDURE — 86850 RBC ANTIBODY SCREEN: CPT

## 2017-04-11 PROCEDURE — 36415 COLL VENOUS BLD VENIPUNCTURE: CPT

## 2017-04-11 PROCEDURE — 93005 ELECTROCARDIOGRAM TRACING: CPT

## 2017-04-11 PROCEDURE — 81003 URINALYSIS AUTO W/O SCOPE: CPT

## 2017-04-11 PROCEDURE — 73560 X-RAY EXAM OF KNEE 1 OR 2: CPT

## 2017-04-11 PROCEDURE — 97164 PT RE-EVAL EST PLAN CARE: CPT

## 2017-04-11 RX ORDER — HYDROMORPHONE HYDROCHLORIDE 2 MG/ML
4 INJECTION INTRAMUSCULAR; INTRAVENOUS; SUBCUTANEOUS ONCE
Qty: 0 | Refills: 0 | Status: DISCONTINUED | OUTPATIENT
Start: 2017-04-11 | End: 2017-04-11

## 2017-04-11 RX ORDER — HYDROMORPHONE HYDROCHLORIDE 2 MG/ML
2 INJECTION INTRAMUSCULAR; INTRAVENOUS; SUBCUTANEOUS
Qty: 56 | Refills: 0 | OUTPATIENT
Start: 2017-04-11 | End: 2017-04-18

## 2017-04-11 RX ORDER — HYDROMORPHONE HYDROCHLORIDE 2 MG/ML
1 INJECTION INTRAMUSCULAR; INTRAVENOUS; SUBCUTANEOUS
Qty: 42 | Refills: 0 | OUTPATIENT
Start: 2017-04-11 | End: 2017-04-18

## 2017-04-11 RX ORDER — ASPIRIN/CALCIUM CARB/MAGNESIUM 324 MG
1 TABLET ORAL
Qty: 30 | Refills: 0 | OUTPATIENT
Start: 2017-04-11 | End: 2017-05-11

## 2017-04-11 RX ADMIN — HYDROMORPHONE HYDROCHLORIDE 4 MILLIGRAM(S): 2 INJECTION INTRAMUSCULAR; INTRAVENOUS; SUBCUTANEOUS at 03:38

## 2017-04-11 RX ADMIN — HYDROMORPHONE HYDROCHLORIDE 4 MILLIGRAM(S): 2 INJECTION INTRAMUSCULAR; INTRAVENOUS; SUBCUTANEOUS at 08:57

## 2017-04-11 RX ADMIN — HYDROMORPHONE HYDROCHLORIDE 2 MILLIGRAM(S): 2 INJECTION INTRAMUSCULAR; INTRAVENOUS; SUBCUTANEOUS at 00:20

## 2017-04-11 RX ADMIN — HEPARIN SODIUM 5000 UNIT(S): 5000 INJECTION INTRAVENOUS; SUBCUTANEOUS at 05:40

## 2017-04-11 RX ADMIN — HYDROMORPHONE HYDROCHLORIDE 4 MILLIGRAM(S): 2 INJECTION INTRAMUSCULAR; INTRAVENOUS; SUBCUTANEOUS at 09:57

## 2017-04-11 RX ADMIN — HYDROMORPHONE HYDROCHLORIDE 2 MILLIGRAM(S): 2 INJECTION INTRAMUSCULAR; INTRAVENOUS; SUBCUTANEOUS at 01:12

## 2017-04-11 RX ADMIN — HYDROMORPHONE HYDROCHLORIDE 4 MILLIGRAM(S): 2 INJECTION INTRAMUSCULAR; INTRAVENOUS; SUBCUTANEOUS at 04:30

## 2017-04-11 RX ADMIN — HYDROMORPHONE HYDROCHLORIDE 4 MILLIGRAM(S): 2 INJECTION INTRAMUSCULAR; INTRAVENOUS; SUBCUTANEOUS at 12:42

## 2017-04-11 RX ADMIN — HYDROMORPHONE HYDROCHLORIDE 4 MILLIGRAM(S): 2 INJECTION INTRAMUSCULAR; INTRAVENOUS; SUBCUTANEOUS at 13:30

## 2017-04-11 NOTE — PROGRESS NOTE ADULT - PROVIDER SPECIALTY LIST ADULT
Internal Medicine
Internal Medicine
Orthopedics
Internal Medicine

## 2017-04-11 NOTE — PROGRESS NOTE ADULT - SUBJECTIVE AND OBJECTIVE BOX
SUBJECTIVE: Patient seen and examined. Pain controlled.  Pt did well o/n  No f/c/n/v/cp/sob.     OBJECTIVE:  NAD  ICU Vital Signs Last 24 Hrs  T(C): 37.1, Max: 37.1 (04-10 @ 16:14)  T(F): 98.7, Max: 98.8 (04-10 @ 16:14)  HR: 88 (88 - 100)  BP: 109/70 (109/68 - 120/80)  BP(mean): --  ABP: --  ABP(mean): --  RR: 14 (14 - 16)  SpO2: 98% (96% - 98%)      Affected extremity:          Dressing: clean/dry/intact  in juan brace         Sensation: SILT         Motor exam: 5/5 TA/GS/EHL         warm well perfused; capillary refill <3 seconds              no new labs o/n      A/P :  Pt is a 52yo Male s/p  R Tib ORIF  -    Pain control  -    DVT ppx: SCD/HSQ  -    Weight bearing status: NWB  -    Physical Therapy  -    Dispo: Home when clears PT

## 2017-04-13 DIAGNOSIS — S82.141A DISPLACED BICONDYLAR FRACTURE OF RIGHT TIBIA, INITIAL ENCOUNTER FOR CLOSED FRACTURE: ICD-10-CM

## 2017-04-13 DIAGNOSIS — R00.0 TACHYCARDIA, UNSPECIFIED: ICD-10-CM
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
## 2017-04-13 DIAGNOSIS — F17.210 NICOTINE DEPENDENCE, CIGARETTES, UNCOMPLICATED: ICD-10-CM
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
## 2017-04-13 DIAGNOSIS — K21.9 GASTRO-ESOPHAGEAL REFLUX DISEASE WITHOUT ESOPHAGITIS: ICD-10-CM
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
## 2017-04-26 ENCOUNTER — OUTPATIENT (OUTPATIENT)
Dept: OUTPATIENT SERVICES | Facility: HOSPITAL | Age: 52
LOS: 1 days | End: 2017-04-26
Payer: COMMERCIAL

## 2017-04-26 PROCEDURE — 73560 X-RAY EXAM OF KNEE 1 OR 2: CPT

## 2017-04-26 PROCEDURE — 73560 X-RAY EXAM OF KNEE 1 OR 2: CPT | Mod: 26,RT

## 2017-05-16 ENCOUNTER — OUTPATIENT (OUTPATIENT)
Dept: OUTPATIENT SERVICES | Facility: HOSPITAL | Age: 52
LOS: 1 days | End: 2017-05-16
Payer: COMMERCIAL

## 2017-05-16 PROCEDURE — 73560 X-RAY EXAM OF KNEE 1 OR 2: CPT

## 2017-05-16 PROCEDURE — 73560 X-RAY EXAM OF KNEE 1 OR 2: CPT | Mod: 26,RT

## 2017-05-30 ENCOUNTER — OUTPATIENT (OUTPATIENT)
Dept: OUTPATIENT SERVICES | Facility: HOSPITAL | Age: 52
LOS: 1 days | End: 2017-05-30
Payer: COMMERCIAL

## 2017-05-30 PROCEDURE — 73560 X-RAY EXAM OF KNEE 1 OR 2: CPT | Mod: 26,RT

## 2017-05-30 PROCEDURE — 73560 X-RAY EXAM OF KNEE 1 OR 2: CPT

## 2017-06-19 ENCOUNTER — OUTPATIENT (OUTPATIENT)
Dept: OUTPATIENT SERVICES | Facility: HOSPITAL | Age: 52
LOS: 1 days | End: 2017-06-19
Payer: COMMERCIAL

## 2017-06-19 PROCEDURE — 73560 X-RAY EXAM OF KNEE 1 OR 2: CPT

## 2017-06-19 PROCEDURE — 73560 X-RAY EXAM OF KNEE 1 OR 2: CPT | Mod: 26,RT

## 2019-05-04 NOTE — CONSULT NOTE ADULT - GUM GEN PE MLT EXAM PC
Exception refill given  Pt's due for f/u  Left message on answering machine to call back     not examined

## 2019-09-23 NOTE — PHYSICAL THERAPY INITIAL EVALUATION ADULT - CRITERIA FOR SKILLED THERAPEUTIC INTERVENTIONS
rehab potential/anticipated discharge recommendation/therapy frequency/functional limitations in following categories/predicted duration of therapy intervention/anticipated equipment needs at discharge/risk reduction/prevention/impairments found no

## 2020-07-31 NOTE — PATIENT PROFILE ADULT. - TEACHING/LEARNING FACTORS INFLUENCE READINESS TO LEARN
PHYSICAL EXAM:  Vital Signs Last 24 Hrs  T(C): 36.6 (07-31-20 @ 05:10)  T(F): 97.9 (07-31-20 @ 05:10), Max: 98.1 (07-30-20 @ 21:46)  HR: 78 (07-31-20 @ 05:10) (78 - 130)  BP: 110/60 (07-31-20 @ 05:10)  BP(mean): 59 (07-30-20 @ 23:36) (59 - 59)  RR: 17 (07-31-20 @ 05:10) (17 - 22)  SpO2: 100% (07-31-20 @ 05:10) (97% - 100%)  Wt(kg): --    Constitutional: NAD, awake and alert  EYES: EOMI  ENT:  Normal Hearing, no tonsillar exudates   Neck: Soft and supple , no thyromegaly   Respiratory: Breath sounds are clear bilaterally, No wheezing, rales or rhonchi  Cardiovascular: S1 and S2, irregular rhythm, not tachycardic, no Murmurs, gallops or rubs, no JVD,    Gastrointestinal: Bowel Sounds present, soft, nontender, nondistended, no guarding, no rebound  Extremities: No cyanosis or clubbing; warm to touch, No LE edema  Vascular: 2+ peripheral pulses lower ex  Neurological: No focal deficits, CN II-XII intact bilaterally, sensation to light touch intact in all extremities, gait intact. Pupils are equally reactive to light and symmetrical in size.   Musculoskeletal: 5/5 strength b/l upper and lower extremities; no joint swelling.  Skin: No rashes  Psych: no depression or anhedonia, AAOx3  HEME: no bruises, no nose bleeds none

## 2020-12-08 NOTE — PROGRESS NOTE ADULT - CONSTITUTIONAL
Patient had 2 episodes of a fib, HR 80s to low 100s, each lasting less than 5 minutes. Patient resting in bed, denies needs.
Well-developed, well nourished

## 2021-09-16 NOTE — ED ADULT NURSE NOTE - ASSIST WITH
What Type Of Note Output Would You Prefer (Optional)?: Standard Output Is The Patient Presenting As Previously Scheduled?: Yes Is This A New Presentation, Or A Follow-Up?: Rash walking/standing/toileting
